# Patient Record
Sex: FEMALE | Race: AMERICAN INDIAN OR ALASKA NATIVE
[De-identification: names, ages, dates, MRNs, and addresses within clinical notes are randomized per-mention and may not be internally consistent; named-entity substitution may affect disease eponyms.]

---

## 2020-03-23 ENCOUNTER — HOSPITAL ENCOUNTER (EMERGENCY)
Dept: HOSPITAL 5 - ED | Age: 30
Discharge: HOME | End: 2020-03-23
Payer: SELF-PAY

## 2020-03-23 VITALS — DIASTOLIC BLOOD PRESSURE: 100 MMHG | SYSTOLIC BLOOD PRESSURE: 137 MMHG

## 2020-03-23 DIAGNOSIS — Z98.51: ICD-10-CM

## 2020-03-23 DIAGNOSIS — D69.6: ICD-10-CM

## 2020-03-23 DIAGNOSIS — Z79.2: ICD-10-CM

## 2020-03-23 DIAGNOSIS — Z79.1: ICD-10-CM

## 2020-03-23 DIAGNOSIS — Z79.899: ICD-10-CM

## 2020-03-23 DIAGNOSIS — R00.2: Primary | ICD-10-CM

## 2020-03-23 DIAGNOSIS — F17.200: ICD-10-CM

## 2020-03-23 DIAGNOSIS — D72.818: ICD-10-CM

## 2020-03-23 DIAGNOSIS — I10: ICD-10-CM

## 2020-03-23 DIAGNOSIS — Z88.8: ICD-10-CM

## 2020-03-23 LAB
ANISOCYTOSIS BLD QL SMEAR: (no result)
BAND NEUTROPHILS # (MANUAL): 0 K/MM3
BUN SERPL-MCNC: 8 MG/DL (ref 7–17)
BUN/CREAT SERPL: 11 %
CALCIUM SERPL-MCNC: 9.1 MG/DL (ref 8.4–10.2)
HCT VFR BLD CALC: 40 % (ref 30.3–42.9)
HEMOLYSIS INDEX: 6
HGB BLD-MCNC: 13.6 GM/DL (ref 10.1–14.3)
MCHC RBC AUTO-ENTMCNC: 34 % (ref 30–34)
MCV RBC AUTO: 81 FL (ref 79–97)
MYELOCYTES # (MANUAL): 0 K/MM3
PLATELET # BLD: 112 K/MM3 (ref 140–440)
PROMYELOCYTES # (MANUAL): 0 K/MM3
RBC # BLD AUTO: 4.96 M/MM3 (ref 3.65–5.03)
TOTAL CELLS COUNTED BLD: 100

## 2020-03-23 PROCEDURE — 93010 ELECTROCARDIOGRAM REPORT: CPT

## 2020-03-23 PROCEDURE — 84484 ASSAY OF TROPONIN QUANT: CPT

## 2020-03-23 PROCEDURE — 36415 COLL VENOUS BLD VENIPUNCTURE: CPT

## 2020-03-23 PROCEDURE — 85007 BL SMEAR W/DIFF WBC COUNT: CPT

## 2020-03-23 PROCEDURE — 80048 BASIC METABOLIC PNL TOTAL CA: CPT

## 2020-03-23 PROCEDURE — 71045 X-RAY EXAM CHEST 1 VIEW: CPT

## 2020-03-23 PROCEDURE — 93005 ELECTROCARDIOGRAM TRACING: CPT

## 2020-03-23 PROCEDURE — 85025 COMPLETE CBC W/AUTO DIFF WBC: CPT

## 2020-03-23 PROCEDURE — 84703 CHORIONIC GONADOTROPIN ASSAY: CPT

## 2020-03-23 NOTE — XRAY REPORT
CHEST 1 VIEW 



INDICATION / CLINICAL INFORMATION:

Chest Pain.



COMPARISON: 

None available.



FINDINGS:



SUPPORT DEVICES: None.

HEART / MEDIASTINUM: No significant abnormality. 

LUNGS / PLEURA: No significant pulmonary or pleural abnormality. No pneumothorax. 



ADDITIONAL FINDINGS: No significant additional findings.



IMPRESSION:

No acute pulmonary or pleural abnormality



Signer Name: Matthew Barrett MD FACR 

Signed: 3/23/2020 3:23 AM

Workstation Name: [id number]

## 2020-03-23 NOTE — EMERGENCY DEPARTMENT REPORT
ED General Adult HPI





- General


Chief complaint: Arrhythmia/Palpitations


Stated complaint: BODYACHES/CHILLS


Time Seen by Provider: 03/23/20 03:07


Source: patient


Mode of arrival: Ambulatory


Limitations: No Limitations





- History of Present Illness


Initial comments: 





Patient is a 29-year-old female presents emergency room with complaints of an 

episode of diaphoresis, chills, felt like her heart was skipping a beat that 

occurred today.  She states that she [place] and [place]  She denies ever having this in the past.  She denies any diarrhea, 

fever, shortness of breath, chest pain, leg swelling, recent travel, recent enriquez

rgery, hormone use, history of cancer.  She is a current every day smoker.  She 

is a nondrinker.  Denies drug use.  She states that she does drink a lot of 

caffeine and drinks approximately 3 sodas a day.  She denies any past medical 

history.  She has an allergy to Rocephin.


Severity scale (0 -10): 0





- Related Data


                                  Previous Rx's











 Medication  Instructions  Recorded  Last Taken  Type


 


Clindamycin [Clindamycin CAP] 300 mg PO Q8H 10 Days #30 cap 12/25/18 Unknown Rx


 


Omeprazole 40 mg PO DAILY #30 capsule. 04/26/19 Unknown Rx


 


Sucralfate [Carafate] 1 gm PO ACHS 7 Days #28 tablet 04/26/19 Unknown Rx


 


levoFLOXacin [Levaquin TAB] 500 mg PO QDAY #10 tablet 04/26/19 Unknown Rx


 


Naproxen 500 mg PO BID 7 Days #14 tablet 11/20/19 Unknown Rx


 


methOCARBAMOL [Robaxin TAB] 500 mg PO Q6H #20 tablet 11/20/19 Unknown Rx


 


Clindamycin [Clindamycin CAP] 450 mg PO TID 7 Days #63 capsule 03/22/20 Unknown 

Rx


 


Ibuprofen [Motrin 600 MG tab] 600 mg PO Q8H PRN #20 tablet 03/22/20 Unknown Rx


 


Penicillin Vk [Veetids TAB] 500 mg PO QID 7 Days #56 tablet 03/22/20 Unknown Rx


 


traMADoL [Ultram 50 MG tab] 50 mg PO Q6HR PRN #7 tablet 03/22/20 Unknown Rx


 


amLODIPine 10 mg PO DAILY #30 tablet 03/23/20 Unknown Rx











                                    Allergies











Allergy/AdvReac Type Severity Reaction Status Date / Time


 


ceftriaxone [From Rocephin] Allergy  Unknown Verified 04/26/19 17:51














ED Review of Systems


ROS: 


Stated complaint: BODYACHES/CHILLS


Other details as noted in HPI





Comment: All other systems reviewed and negative





ED Past Medical Hx





- Past Medical History


Previous Medical History?: Yes


Hx Hypertension: Yes (Not taking medication)


Additional medical history: vaginal ablation





- Surgical History


Past Surgical History?: Yes


Additional Surgical History: tubal ligation, Endometrial Ablation





- Social History


Smoking Status: Current Some Day Smoker





- Medications


Home Medications: 


                                Home Medications











 Medication  Instructions  Recorded  Confirmed  Last Taken  Type


 


Clindamycin [Clindamycin CAP] 300 mg PO Q8H 10 Days #30 cap 12/25/18  Unknown Rx


 


Omeprazole 40 mg PO DAILY #30 capsule. 04/26/19  Unknown Rx


 


Sucralfate [Carafate] 1 gm PO ACHS 7 Days #28 tablet 04/26/19  Unknown Rx


 


levoFLOXacin [Levaquin TAB] 500 mg PO QDAY #10 tablet 04/26/19  Unknown Rx


 


Naproxen 500 mg PO BID 7 Days #14 tablet 11/20/19  Unknown Rx


 


methOCARBAMOL [Robaxin TAB] 500 mg PO Q6H #20 tablet 11/20/19  Unknown Rx


 


Clindamycin [Clindamycin CAP] 450 mg PO TID 7 Days #63 capsule 03/22/20  Unknown

Rx


 


Ibuprofen [Motrin 600 MG tab] 600 mg PO Q8H PRN #20 tablet 03/22/20  Unknown Rx


 


Penicillin Vk [Veetids TAB] 500 mg PO QID 7 Days #56 tablet 03/22/20  Unknown Rx


 


traMADoL [Ultram 50 MG tab] 50 mg PO Q6HR PRN #7 tablet 03/22/20  Unknown Rx


 


amLODIPine 10 mg PO DAILY #30 tablet 03/23/20  Unknown Rx














ED Physical Exam





- General


Limitations: No Limitations


General appearance: alert, in no apparent distress





- Head


Head exam: Present: atraumatic, normocephalic





- Eye


Eye exam: Present: normal appearance





- ENT


ENT exam: Present: mucous membranes moist





- Respiratory


Respiratory exam: Present: normal lung sounds bilaterally.  Absent: respiratory 

distress, wheezes, rales, rhonchi, stridor, chest wall tenderness, accessory 

muscle use, decreased breath sounds, prolonged expiratory





- Cardiovascular


Cardiovascular Exam: Present: regular rate, normal rhythm, normal heart sounds. 

Absent: systolic murmur, diastolic murmur, rubs, gallop





- Neurological Exam


Neurological exam: Present: alert, oriented X3





- Psychiatric


Psychiatric exam: Present: normal affect, normal mood





- Skin


Skin exam: Present: warm, dry, intact





ED Course


                                   Vital Signs











  03/23/20 03/23/20 03/23/20





  00:28 00:44 04:16


 


Temperature 98.2 F  99 F


 


Pulse Rate 91 H 91 H 77


 


Respiratory 18  18





Rate   


 


Blood Pressure 159/111 159/11 


 


Blood Pressure   137/100





[Left]   


 


O2 Sat by Pulse 100  100





Oximetry   














ED Medical Decision Making





- Lab Data


Result diagrams: 


                                 03/23/20 00:44





                                 03/23/20 00:44








                                   Lab Results











  03/23/20 03/23/20 03/23/20 Range/Units





  00:44 00:44 00:44 


 


WBC   2.7 L   (4.5-11.0)  K/mm3


 


RBC   4.96   (3.65-5.03)  M/mm3


 


Hgb   13.6   (10.1-14.3)  gm/dl


 


Hct   40.0   (30.3-42.9)  %


 


MCV   81   (79-97)  fl


 


MCH   27 L   (28-32)  pg


 


MCHC   34   (30-34)  %


 


RDW   15.7 H   (13.2-15.2)  %


 


Plt Count   112 L   (140-440)  K/mm3


 


Mono % (Auto)   Np   


 


Add Manual Diff   Complete   


 


Total Counted   100   


 


Seg Neuts % (Manual)   60.0   (40.0-70.0)  %


 


Band Neutrophils %   0   %


 


Lymphocytes % (Manual)   20.0   (13.4-35.0)  %


 


Reactive Lymphs % (Man)   0   %


 


Monocytes % (Manual)   18.0 H   (0.0-7.3)  %


 


Eosinophils % (Manual)   1.0   (0.0-4.3)  %


 


Basophils % (Manual)   1.0   (0.0-1.8)  %


 


Metamyelocytes %   0   %


 


Myelocytes %   0   %


 


Promyelocytes %   0   %


 


Blast Cells %   0   %


 


Nucleated RBC %   Not Reportable   


 


Seg Neutrophils # Man   1.6 L   (1.8-7.7)  K/mm3


 


Band Neutrophils #   0.0   K/mm3


 


Lymphocytes # (Manual)   0.5 L   (1.2-5.4)  K/mm3


 


Abs React Lymphs (Man)   0.0   K/mm3


 


Monocytes # (Manual)   0.5   (0.0-0.8)  K/mm3


 


Eosinophils # (Manual)   0.0   (0.0-0.4)  K/mm3


 


Basophils # (Manual)   0.0   (0.0-0.1)  K/mm3


 


Metamyelocytes #   0.0   K/mm3


 


Myelocytes #   0.0   K/mm3


 


Promyelocytes #   0.0   K/mm3


 


Blast Cells #   0.0   K/mm3


 


WBC Morphology   Not Reportable   


 


Hypersegmented Neuts   Not Reportable   


 


Hyposegmented Neuts   Not Reportable   


 


Hypogranular Neuts   Not Reportable   


 


Smudge Cells   Not Reportable   


 


Toxic Granulation   Not Reportable   


 


Toxic Vacuolation   Not Reportable   


 


Dohle Bodies   Not Reportable   


 


Pelger-Huet Anomaly   Not Reportable   


 


Gina Rods   Not Reportable   


 


Platelet Estimate   Consistent w auto   


 


Clumped Platelets   Not Reportable   


 


Plt Clumps, EDTA   Not Reportable   


 


Large Platelets   Not Reportable   


 


Giant Platelets   Not Reportable   


 


Platelet Satelliting   Not Reportable   


 


Plt Morphology Comment   Not Reportable   


 


RBC Morphology   Not Reportable   


 


Dimorphic RBCs   Not Reportable   


 


Polychromasia   Not Reportable   


 


Hypochromasia   Not Reportable   


 


Poikilocytosis   Not Reportable   


 


Anisocytosis   Rare   


 


Microcytosis   Not Reportable   


 


Macrocytosis   Not Reportable   


 


Spherocytes   Not Reportable   


 


Pappenheimer Bodies   Not Reportable   


 


Sickle Cells   Not Reportable   


 


Target Cells   Not Reportable   


 


Tear Drop Cells   Not Reportable   


 


Ovalocytes   Rare   


 


Helmet Cells   Not Reportable   


 


[place] Bodies   Not Reportable   


 


Cabot Rings   Not Reportable   


 


[place] Cells   Not Reportable   


 


Bite Cells   Not Reportable   


 


Crenated Cell   Not Reportable   


 


Elliptocytes   Not Reportable   


 


Acanthocytes (Spur)   Not Reportable   


 


Rouleaux   Not Reportable   


 


Hemoglobin C Crystals   Not Reportable   


 


Schistocytes   Not Reportable   


 


Malaria parasites   Not Reportable   


 


Bandar Bodies   Not Reportable   


 


Hem Pathologist Commnt   No   


 


Sodium    138  (137-145)  mmol/L


 


Potassium    3.6  (3.6-5.0)  mmol/L


 


Chloride    99.2  ()  mmol/L


 


Carbon Dioxide    28  (22-30)  mmol/L


 


Anion Gap    14  mmol/L


 


BUN    8  (7-17)  mg/dL


 


Creatinine    0.7  (0.7-1.2)  mg/dL


 


Estimated GFR    > 60  ml/min


 


BUN/Creatinine Ratio    11  %


 


Glucose    100  ()  mg/dL


 


Calcium    9.1  (8.4-10.2)  mg/dL


 


Troponin T    < 0.010  (0.00-0.029)  ng/mL


 


HCG, Qual  Negative    (Negative)  














  03/23/20 Range/Units





  03:44 


 


WBC   (4.5-11.0)  K/mm3


 


RBC   (3.65-5.03)  M/mm3


 


Hgb   (10.1-14.3)  gm/dl


 


Hct   (30.3-42.9)  %


 


MCV   (79-97)  fl


 


MCH   (28-32)  pg


 


MCHC   (30-34)  %


 


RDW   (13.2-15.2)  %


 


Plt Count   (140-440)  K/mm3


 


Mono % (Auto)   


 


Add Manual Diff   


 


Total Counted   


 


Seg Neuts % (Manual)   (40.0-70.0)  %


 


Band Neutrophils %   %


 


Lymphocytes % (Manual)   (13.4-35.0)  %


 


Reactive Lymphs % (Man)   %


 


Monocytes % (Manual)   (0.0-7.3)  %


 


Eosinophils % (Manual)   (0.0-4.3)  %


 


Basophils % (Manual)   (0.0-1.8)  %


 


Metamyelocytes %   %


 


Myelocytes %   %


 


Promyelocytes %   %


 


Blast Cells %   %


 


Nucleated RBC %   


 


Seg Neutrophils # Man   (1.8-7.7)  K/mm3


 


Band Neutrophils #   K/mm3


 


Lymphocytes # (Manual)   (1.2-5.4)  K/mm3


 


Abs React Lymphs (Man)   K/mm3


 


Monocytes # (Manual)   (0.0-0.8)  K/mm3


 


Eosinophils # (Manual)   (0.0-0.4)  K/mm3


 


Basophils # (Manual)   (0.0-0.1)  K/mm3


 


Metamyelocytes #   K/mm3


 


Myelocytes #   K/mm3


 


Promyelocytes #   K/mm3


 


Blast Cells #   K/mm3


 


WBC Morphology   


 


Hypersegmented Neuts   


 


Hyposegmented Neuts   


 


Hypogranular Neuts   


 


Smudge Cells   


 


Toxic Granulation   


 


Toxic Vacuolation   


 


Dohle Bodies   


 


Pelger-Huet Anomaly   


 


Gina Rods   


 


Platelet Estimate   


 


Clumped Platelets   


 


Plt Clumps, EDTA   


 


Large Platelets   


 


Giant Platelets   


 


Platelet Satelliting   


 


Plt Morphology Comment   


 


RBC Morphology   


 


Dimorphic RBCs   


 


Polychromasia   


 


Hypochromasia   


 


Poikilocytosis   


 


Anisocytosis   


 


Microcytosis   


 


Macrocytosis   


 


Spherocytes   


 


Pappenheimer Bodies   


 


Sickle Cells   


 


Target Cells   


 


Tear Drop Cells   


 


Ovalocytes   


 


Helmet Cells   


 


[place] Bodies   


 


Cabot Rings   


 


Samuel Cells   


 


Bite Cells   


 


Crenated Cell   


 


Elliptocytes   


 


Acanthocytes (Spur)   


 


Rouleaux   


 


Hemoglobin C Crystals   


 


Schistocytes   


 


Malaria parasites   


 


Bandar Bodies   


 


Hem Pathologist Commnt   


 


Sodium   (137-145)  mmol/L


 


Potassium   (3.6-5.0)  mmol/L


 


Chloride   ()  mmol/L


 


Carbon Dioxide   (22-30)  mmol/L


 


Anion Gap   mmol/L


 


BUN   (7-17)  mg/dL


 


Creatinine   (0.7-1.2)  mg/dL


 


Estimated GFR   ml/min


 


BUN/Creatinine Ratio   %


 


Glucose   ()  mg/dL


 


Calcium   (8.4-10.2)  mg/dL


 


Troponin T  < 0.010  (0.00-0.029)  ng/mL


 


HCG, Qual   (Negative)  














- EKG Data


EKG shows normal: sinus rhythm, axis, intervals, QRS complexes, ST-T waves


Rate: normal





- Radiology Data


Radiology results: report reviewed





Chest x-ray


No acute abnormality





- Medical Decision Making





Patient is a 29-year-old female presents emergency room with complaints of an 

episode of diaphoresis, chills, felt like her heart was skipping a beat that 

occurred today.  She states that [place]  She denies ever having this in the past.  She denies any diarrhea, 

fever, shortness of breath, chest pain, leg swelling, recent travel, recent 

surgery, hormone use, history of cancer.  She is a current every day smoker.  

She is a nondrinker.  Denies drug use.  She states that she does drink a lot of 

caffeine and drinks approximately 3 sodas a day.  She denies any past medical 

history.  She has an allergy to Rocephin.  Vitals with elevated blood pressure, 

otherwise stable.  No abnormality on physical examination as documented in 

chart.  EKG within normal limits.  Chest x-ray with no acute process.  Troponin 

is negative x2.  Labs with nonspecific decrease in white blood cell count, 

platelets, lymphocytes could be due to medications versus viral infection.  PERC

 criteria negative for PE.  ELKE score is 0 and heart score is 1, very low risk 

for cardiac event. it appears during patient's last several visits her blood 

pressure has always been elevated, she is not taking any blood pressure medi

cation.  Patient will be placed on amlodipine 10 mg daily. advised pt to Please 

take medication as prescribed.  Avoid caffeine use or any other stimulants.  

Please consider stopping smoking.  Follow-up with a cardiologist.  Follow-up 

with your primary care doctor.  Eat a low-sodium diet, incorporate daily 

exercise.  Keep a blood pressure log and take your blood pressure 3 times a day 

and take this to the primary care physician.  Please have routine blood work 

repeated with a primary care doctor.  Return to the emergency room for any new 

or worsening symptoms.


Critical care attestation.: 


If time is entered above; I have spent that time in minutes in the direct care o

f this critically ill patient, excluding procedure time.








ED Disposition


Clinical Impression: 


 Palpitations, Thrombocytopenia





Hypertension


Qualifiers:


 Hypertension type: unspecified Qualified Code(s): I10 - Essential (primary) 

hypertension





Leukopenia


Qualifiers:


 Leukopenia type: neutropenia Neutropenia type: unspecified Qualified Code(s): 

D70.9 - Neutropenia, unspecified





Disposition: DC-01 TO HOME OR SELFCARE


Is pt being admited?: No


Does the pt Need Aspirin: No


Condition: Stable


Instructions:  Palpitations (ED), Hypertension (ED)


Additional Instructions: 


Please take medication as prescribed.  Avoid caffeine use or any other 

stimulants.  Please consider stopping smoking.  Follow-up with a cardiologist.  

Follow-up with your primary care doctor.  Eat a low-sodium diet, incorporate 

daily exercise.  Keep a blood pressure log and take your blood pressure 3 times 

a day and take this to the primary care physician.  Please have routine blood 

work repeated with a primary care doctor.  Return to the emergency room for any 

new or worsening symptoms.


Prescriptions: 


amLODIPine 10 mg PO DAILY #30 tablet


Referrals: 


[facility] [Provider Group] - 3-5 Days


[facility] [Provider Group] - 3-5 Days


YEMI HAN MD [Staff Physician] - 3-5 Days


[facility] [Outside] - 3-5 Days


Time of Disposition: 04:48


Print Language: ENGLISH

## 2020-03-24 ENCOUNTER — HOSPITAL ENCOUNTER (INPATIENT)
Dept: HOSPITAL 5 - ED | Age: 30
LOS: 4 days | Discharge: HOME | DRG: 690 | End: 2020-03-28
Attending: INTERNAL MEDICINE | Admitting: INTERNAL MEDICINE
Payer: COMMERCIAL

## 2020-03-24 DIAGNOSIS — Z20.828: ICD-10-CM

## 2020-03-24 DIAGNOSIS — D72.819: ICD-10-CM

## 2020-03-24 DIAGNOSIS — D69.6: ICD-10-CM

## 2020-03-24 DIAGNOSIS — Z82.49: ICD-10-CM

## 2020-03-24 DIAGNOSIS — Z88.8: ICD-10-CM

## 2020-03-24 DIAGNOSIS — J06.9: ICD-10-CM

## 2020-03-24 DIAGNOSIS — Z98.51: ICD-10-CM

## 2020-03-24 DIAGNOSIS — N39.0: Primary | ICD-10-CM

## 2020-03-24 DIAGNOSIS — R65.10: ICD-10-CM

## 2020-03-24 LAB
ALBUMIN SERPL-MCNC: 3.8 G/DL (ref 3.9–5)
ALT SERPL-CCNC: 13 UNITS/L (ref 7–56)
BAND NEUTROPHILS # (MANUAL): 0 K/MM3
BILIRUB DIRECT SERPL-MCNC: < 0.2 MG/DL (ref 0–0.2)
BILIRUB UR QL STRIP: (no result)
BLOOD UR QL VISUAL: (no result)
BUN SERPL-MCNC: 9 MG/DL (ref 7–17)
BUN/CREAT SERPL: 13 %
CALCIUM SERPL-MCNC: 8.7 MG/DL (ref 8.4–10.2)
HCT VFR BLD CALC: 44.6 % (ref 30.3–42.9)
HCT VFR BLD CALC: 45.5 % (ref 30.3–42.9)
HEMOLYSIS INDEX: 25
HGB BLD-MCNC: 14.2 GM/DL (ref 10.1–14.3)
HGB BLD-MCNC: 14.4 GM/DL (ref 10.1–14.3)
MCHC RBC AUTO-ENTMCNC: 32 % (ref 30–34)
MCHC RBC AUTO-ENTMCNC: 32 % (ref 30–34)
MCV RBC AUTO: 82 FL (ref 79–97)
MCV RBC AUTO: 83 FL (ref 79–97)
MUCOUS THREADS #/AREA URNS HPF: (no result) /HPF
MYELOCYTES # (MANUAL): 0 K/MM3
PH UR STRIP: 6 [PH] (ref 5–7)
PLATELET # BLD: 88 K/MM3 (ref 140–440)
PLATELET # BLD: 97 K/MM3 (ref 140–440)
PROMYELOCYTES # (MANUAL): 0 K/MM3
PROT UR STRIP-MCNC: (no result) MG/DL
RBC # BLD AUTO: 5.41 M/MM3 (ref 3.65–5.03)
RBC # BLD AUTO: 5.5 M/MM3 (ref 3.65–5.03)
RBC #/AREA URNS HPF: 6 /HPF (ref 0–6)
TOTAL CELLS COUNTED BLD: 100
UROBILINOGEN UR-MCNC: 2 MG/DL (ref ?–2)
WBC #/AREA URNS HPF: 8 /HPF (ref 0–6)

## 2020-03-24 PROCEDURE — 86689 HTLV/HIV CONFIRMJ ANTIBODY: CPT

## 2020-03-24 PROCEDURE — 85027 COMPLETE CBC AUTOMATED: CPT

## 2020-03-24 PROCEDURE — 71046 X-RAY EXAM CHEST 2 VIEWS: CPT

## 2020-03-24 PROCEDURE — 36415 COLL VENOUS BLD VENIPUNCTURE: CPT

## 2020-03-24 PROCEDURE — 84703 CHORIONIC GONADOTROPIN ASSAY: CPT

## 2020-03-24 PROCEDURE — 81025 URINE PREGNANCY TEST: CPT

## 2020-03-24 PROCEDURE — 80048 BASIC METABOLIC PNL TOTAL CA: CPT

## 2020-03-24 PROCEDURE — 87400 INFLUENZA A/B EACH AG IA: CPT

## 2020-03-24 PROCEDURE — 87086 URINE CULTURE/COLONY COUNT: CPT

## 2020-03-24 PROCEDURE — 84145 PROCALCITONIN (PCT): CPT

## 2020-03-24 PROCEDURE — 80076 HEPATIC FUNCTION PANEL: CPT

## 2020-03-24 PROCEDURE — 85025 COMPLETE CBC W/AUTO DIFF WBC: CPT

## 2020-03-24 PROCEDURE — 87040 BLOOD CULTURE FOR BACTERIA: CPT

## 2020-03-24 PROCEDURE — 81001 URINALYSIS AUTO W/SCOPE: CPT

## 2020-03-24 PROCEDURE — 85007 BL SMEAR W/DIFF WBC COUNT: CPT

## 2020-03-24 NOTE — EVENT NOTE
ED Screening Note


Date of service: 03/24/20


Time: 19:17


ED Screening Note: 








This initial assessment/diagnostic orders/clinical plan/treatment(s) is/are 

subject to change based on patients health status, clinical progression and re-

assessment by fellow clinical providers in the ED. Further treatment and workup 

at subsequent clinical providers discretion. Patient/guardian urged not to elope

from the ED as their condition may be serious if not clinically assessed and 

managed. 





Initial orders include: 


30yo F states that she has fever, chest congestion and malaise x 2 days.

## 2020-03-24 NOTE — HISTORY AND PHYSICAL REPORT
History of Present Illness


History of present illness: 


29-year-old woman with a history of hypertension comes emergency room for 

evaluation.  Patient was initially seen in the emergency room on the 22nd for 

tooth pain, then on the 23rd for an episode of palpitation.  She returns today 

complaining of a nonproductive cough, fever, chills, body aches, fatigue.  [place] 

[place], denies recent travel, sick contacts.  She has a history of

thrombocytopenia since 2018, no bleeding from mucosal surfaces.  Patient will be

admitted for upper respiratory symptoms, possible corona


Review Of  Systems:


Constitutional: no weight loss


Ears, eyes, nose, mouth and throat: no nasal congestion, no nasal discharge, no 

sinus pressure, blurry vision, diplopia


Neck: No neck pain or rigidity.


Cardiovascular: No  palpitations, chest pain


Respiratory: No shortness of breath, cough


Gastrointestinal: No hematochezia, abdominal pain


Genitourinary : no dysuria, frequency


Musculoskeletal: no joint pain


Integumentary: no rash, no pruritis


Neurological: no parathesias, focal weakness


Endocrine: no cold or heat intolerance, no polyuria or polydipsia


Hematologic/Lymphatic: no easy bruising, no easy bleeding, no gland swelling


Allergic/Immunologic: no urticaria, no angioedema.





PAST MEDICAL HISTORY: Hypertension





PAST SURGICAL HISTORY: None





SOCIAL HISTORY: Denies alcohol, tobacco,  drugs





FAMILY HISTORY: Hypertension











Medications and Allergies


                                    Allergies











Allergy/AdvReac Type Severity Reaction Status Date / Time


 


ceftriaxone [From Rocephin] Allergy  Unknown Verified 04/26/19 17:51











                                Home Medications











 Medication  Instructions  Recorded  Confirmed  Last Taken  Type


 


Omeprazole 40 mg PO DAILY #30 capsule. 04/26/19  Unknown Rx


 


Sucralfate [Carafate] 1 gm PO ACHS 7 Days #28 tablet 04/26/19  Unknown Rx


 


methOCARBAMOL [Robaxin TAB] 500 mg PO Q6H #20 tablet 11/20/19  Unknown Rx


 


Ibuprofen [Motrin 600 MG tab] 600 mg PO Q8H PRN #20 tablet 03/22/20  Unknown Rx


 


traMADoL [Ultram 50 MG tab] 50 mg PO Q6HR PRN #7 tablet 03/22/20  Unknown Rx


 


amLODIPine 10 mg PO DAILY #30 tablet 03/28/20  Unknown Rx


 


levoFLOXacin [Levaquin TAB] 750 mg PO DAILY #3 tablet 03/28/20  Unknown Rx











Active Meds: 


Active Medications





Metoclopramide HCl (Reglan)  10 mg IV Q6H PRN


   PRN Reason: Nausea And Vomiting











Exam





- Physical Exam


Narrative exam: 


Gen. appearance: Patient lying in bed, no apparent distress


HEENT: Normocephalic, atraumatic, pupils equally round and reactive to light, 

extraocular movement intact, and no sclericterus,. No JVD or thyromegaly or 

nodule,neck supple, no carotid bruit ,mucous membranes moist, no exudate or 

erythema


Heart: S1, S2, regular rate and rhythm


Lungs: Clear bilaterally, breathing comfortable


Abdomen: Positive bowel sounds, nontender, nondistended, no organomegaly


Extremity: no edema, cyanosis, clubbing


Skin: No rash, nodules, warm, dry


Neuro: Cranial nerves II to XII intact, speech is fluent, moves extremities, 

sensory intact











- Constitutional


Vitals: 


                                        











Temp Pulse Resp BP Pulse Ox


 


 100.5 F H  98 H  18   152/101   99 


 


 03/24/20 19:19  03/24/20 19:19  03/24/20 19:19  03/24/20 19:19  03/24/20 19:19














Results





- Labs


CBC & Chem 7: 


                                 03/25/20 04:11





                                 03/25/20 04:11


Labs: 


                              Abnormal lab results











  03/24/20 03/24/20 03/24/20 Range/Units





  19:27 19:27 19:27 


 


WBC  2.3 L    (4.5-11.0)  K/mm3


 


RBC  5.50 H    (3.65-5.03)  M/mm3


 


Hgb  14.4 H    (10.1-14.3)  gm/dl


 


Hct  45.5 H    (30.3-42.9)  %


 


MCH  26 L    (28-32)  pg


 


RDW  16.0 H    (13.2-15.2)  %


 


Plt Count  88 L    (140-440)  K/mm3


 


Seg Neutrophils # Man     (1.8-7.7)  K/mm3


 


Lymphocytes # (Manual)     (1.2-5.4)  K/mm3


 


Sodium   133 L   (137-145)  mmol/L


 


Chloride   96.5 L   ()  mmol/L


 


Carbon Dioxide   20 L D   (22-30)  mmol/L


 


Albumin    3.8 L  (3.9-5)  g/dL


 


Urine WBC (Auto)     (0.0-6.0)  /HPF














  03/24/20 03/24/20 Range/Units





  Unknown Unknown 


 


WBC   2.3 L  (4.5-11.0)  K/mm3


 


RBC   5.41 H  (3.65-5.03)  M/mm3


 


Hgb    (10.1-14.3)  gm/dl


 


Hct   44.6 H  (30.3-42.9)  %


 


MCH   26 L  (28-32)  pg


 


RDW   16.2 H  (13.2-15.2)  %


 


Plt Count   97 L  (140-440)  K/mm3


 


Seg Neutrophils # Man   1.5 L  (1.8-7.7)  K/mm3


 


Lymphocytes # (Manual)   0.5 L  (1.2-5.4)  K/mm3


 


Sodium    (137-145)  mmol/L


 


Chloride    ()  mmol/L


 


Carbon Dioxide    (22-30)  mmol/L


 


Albumin    (3.9-5)  g/dL


 


Urine WBC (Auto)  8.0 H   (0.0-6.0)  /HPF














- Imaging and Cardiology


EKG: image reviewed


Chest x-ray: report reviewed





Assessment and Plan


Assessment


Upper respiratory symptoms, rule out COVID


Patient will be placed on droplet and contact precautions


COVID forms were filled out, consult ID





Hypertension


IV hydralazine for control,, continue outpatient medications





Chronic leukocytopenia and leukopenia, continue to monitor


Follow-up HIV testing





Urinary tract infection


Start IV Levaquin, follow culture


DVT prophylax

## 2020-03-24 NOTE — EMERGENCY DEPARTMENT REPORT
<GUSTAVO CHRISTIAN III - Last Filed: 03/25/20 00:40>





- General


Chief Complaint: Upper Respiratory Infection


Stated Complaint: CHILLS,BODYACHE, HEADACHE


Time Seen by Provider: 03/24/20 19:17





- Related Data


                                  Previous Rx's











 Medication  Instructions  Recorded  Last Taken  Type


 


Clindamycin [Clindamycin CAP] 300 mg PO Q8H 10 Days #30 cap 12/25/18 Unknown Rx


 


Omeprazole 40 mg PO DAILY #30 capsule. 04/26/19 Unknown Rx


 


Sucralfate [Carafate] 1 gm PO ACHS 7 Days #28 tablet 04/26/19 Unknown Rx


 


levoFLOXacin [Levaquin TAB] 500 mg PO QDAY #10 tablet 04/26/19 Unknown Rx


 


Naproxen 500 mg PO BID 7 Days #14 tablet 11/20/19 Unknown Rx


 


methOCARBAMOL [Robaxin TAB] 500 mg PO Q6H #20 tablet 11/20/19 Unknown Rx


 


Clindamycin [Clindamycin CAP] 450 mg PO TID 7 Days #63 capsule 03/22/20 Unknown 

Rx


 


Ibuprofen [Motrin 600 MG tab] 600 mg PO Q8H PRN #20 tablet 03/22/20 Unknown Rx


 


Penicillin Vk [Veetids TAB] 500 mg PO QID 7 Days #56 tablet 03/22/20 Unknown Rx


 


traMADoL [Ultram 50 MG tab] 50 mg PO Q6HR PRN #7 tablet 03/22/20 Unknown Rx


 


amLODIPine 10 mg PO DAILY #30 tablet 03/23/20 Unknown Rx











                                    Allergies











Allergy/AdvReac Type Severity Reaction Status Date / Time


 


ceftriaxone [From Rocephin] Allergy  Unknown Verified 04/26/19 17:51














ED Past Medical Hx





- Medications


Home Medications: 


                                Home Medications











 Medication  Instructions  Recorded  Confirmed  Last Taken  Type


 


Clindamycin [Clindamycin CAP] 300 mg PO Q8H 10 Days #30 cap 12/25/18  Unknown Rx


 


Omeprazole 40 mg PO DAILY #30 capsule. 04/26/19  Unknown Rx


 


Sucralfate [Carafate] 1 gm PO ACHS 7 Days #28 tablet 04/26/19  Unknown Rx


 


levoFLOXacin [Levaquin TAB] 500 mg PO QDAY #10 tablet 04/26/19  Unknown Rx


 


Naproxen 500 mg PO BID 7 Days #14 tablet 11/20/19  Unknown Rx


 


methOCARBAMOL [Robaxin TAB] 500 mg PO Q6H #20 tablet 11/20/19  Unknown Rx


 


Clindamycin [Clindamycin CAP] 450 mg PO TID 7 Days #63 capsule 03/22/20  Unknown

Rx


 


Ibuprofen [Motrin 600 MG tab] 600 mg PO Q8H PRN #20 tablet 03/22/20  Unknown Rx


 


Penicillin Vk [Veetids TAB] 500 mg PO QID 7 Days #56 tablet 03/22/20  Unknown Rx


 


traMADoL [Ultram 50 MG tab] 50 mg PO Q6HR PRN #7 tablet 03/22/20  Unknown Rx


 


amLODIPine 10 mg PO DAILY #30 tablet 03/23/20  Unknown Rx














ED Course





- Reevaluation(s)


Reevaluation #2: 


I examined patient.  I agree with midlevel's assessment and plan.





I discussed all results with patient.  I discussed plan of care with patient.  

Patient agrees with plan of care and admission.  Patient to be admitted to the 

hospitalist service.


03/25/20 00:41














ED Medical Decision Making





- Lab Data


Result diagrams: 


                                03/24/20 Unknown





                                 03/24/20 19:27





ED Disposition


Clinical Impression: 


 Thrombocytopenia, Cough, Chills





Fever


Qualifiers:


 Fever type: unspecified Qualified Code(s): R50.9 - Fever, unspecified





Leukopenia


Qualifiers:


 Leukopenia type: neutropenia Neutropenia type: unspecified Qualified Code(s): 

D70.9 - Neutropenia, unspecified





Neutropenia


Qualifiers:


 Neutropenia type: unspecified Qualified Code(s): D70.9 - Neutropenia, 

unspecified





Headache


Qualifiers:


 Headache type: unspecified Headache chronicity pattern: acute headache 

Intractability: not intractable Qualified Code(s): R51 - Headache





Disposition: DC-09 OP ADMIT IP TO THIS HOSP


Is pt being admited?: Yes


Does the pt Need Aspirin: No


Condition: Critical





<SERGIO GIPSON - Last Filed: 03/25/20 03:01>





- General


Source: patient


Mode of arrival: Ambulatory


Limitations: No Limitations





- History of Present Illness


Initial Comments: 





Patient is a 29-year-old female presents emergency room with complaints of URI 

symptoms that began yesterday.  she states the fever began today. She has 

associated cough, headache, chills, generalized body ache, fever, nausea.  She 

denies any shortness of breath, chest pain, vomiting, diarrhea, urinary 

symptoms, abdominal pain.  She has a past medical history of hypertension.  She 

has an allergy to Rocephin.  She denies any sick contacts or recent travel or r

ecent surgeries.  Patient was evaluated in the emergency department yesterday 

early morning with similar symptoms but did not have a fever at that time and 

had a chest x-ray performed with no acute process, labs did show some decrease 

in the white blood cell count and platelet at that time.





ED Review of Systems


ROS: 


Stated complaint: CHILLS,BODYACHE, HEADACHE


Other details as noted in HPI





Comment: All other systems reviewed and negative





ED Past Medical Hx





- Past Medical History


Hx Hypertension: Yes (Not taking medication)


Additional medical history: vaginal ablation





- Surgical History


Additional Surgical History: tubal ligation, Endometrial Ablation





- Social History


Smoking Status: Current Some Day Smoker





ED Physical Exam





- General


Limitations: No Limitations


General appearance: alert, in no apparent distress





- Head


Head exam: Present: atraumatic, normocephalic





- Eye


Eye exam: Present: normal appearance





- ENT


ENT exam: Present: normal orophraynx, mucous membranes moist, TM's normal 

bilaterally, normal external ear exam





- Respiratory


Respiratory exam: Present: normal lung sounds bilaterally.  Absent: respiratory 

distress, wheezes, rales, rhonchi, stridor, chest wall tenderness, accessory 

muscle use, decreased breath sounds, prolonged expiratory





- Cardiovascular


Cardiovascular Exam: Present: regular rate, normal rhythm, normal heart sounds. 

Absent: systolic murmur, diastolic murmur, rubs, gallop





- Neurological Exam


Neurological exam: Present: alert, oriented X3, CN II-XII intact, normal gait.  

Absent: motor sensory deficit





- Psychiatric


Psychiatric exam: Present: normal affect, normal mood





- Skin


Skin exam: Present: warm, dry, intact





ED Course


                                   Vital Signs











  03/24/20 03/24/20





  18:56 19:19


 


Temperature 100.3 F H 100.5 F H


 


Pulse Rate 89 98 H


 


Respiratory 18 18





Rate  


 


Blood Pressure  152/101


 


Blood Pressure 177/127 





[Left]  


 


O2 Sat by Pulse 100 99





Oximetry  














- Reevaluation(s)


Reevaluation #1: 





03/24/20 23:15


Discussed case with Dr. Christian, ER attending regarding patient history and 

results recommended admission to the hospitalist service








- Consultations


Consultation #1: 





03/24/20 23:18





Spoke with Dr. Macdonald, hospitalist regarding patient history and results, advised

 to place on droplet precautions and to submit COVID 19 form to the CDC, will 

accept and resume care of patient, will admit patient to the hospital





ED Medical Decision Making





- Lab Data


Result diagrams: 


                                03/24/20 Unknown





                                 03/24/20 19:27








                                   Lab Results











  03/24/20 03/24/20 03/24/20 Range/Units





  19:27 19:27 19:27 


 


WBC  2.3 L    (4.5-11.0)  K/mm3


 


RBC  5.50 H    (3.65-5.03)  M/mm3


 


Hgb  14.4 H    (10.1-14.3)  gm/dl


 


Hct  45.5 H    (30.3-42.9)  %


 


MCV  83    (79-97)  fl


 


MCH  26 L    (28-32)  pg


 


MCHC  32    (30-34)  %


 


RDW  16.0 H    (13.2-15.2)  %


 


Plt Count  88 L    (140-440)  K/mm3


 


Sodium   133 L   (137-145)  mmol/L


 


Potassium   3.7   (3.6-5.0)  mmol/L


 


Chloride   96.5 L   ()  mmol/L


 


Carbon Dioxide   20 L D   (22-30)  mmol/L


 


Anion Gap   20   mmol/L


 


BUN   9   (7-17)  mg/dL


 


Creatinine   0.7   (0.7-1.2)  mg/dL


 


Estimated GFR   > 60   ml/min


 


BUN/Creatinine Ratio   13   %


 


Glucose   84   ()  mg/dL


 


Calcium   8.7   (8.4-10.2)  mg/dL


 


Total Bilirubin    0.20  (0.1-1.2)  mg/dL


 


Direct Bilirubin    < 0.2  (0-0.2)  mg/dL


 


Indirect Bilirubin    0.0  mg/dL


 


AST    26  (5-40)  units/L


 


ALT    13  (7-56)  units/L


 


Alkaline Phosphatase    97  ()  units/L


 


Total Protein    7.1  (6.3-8.2)  g/dL


 


Albumin    3.8 L  (3.9-5)  g/dL


 


Albumin/Globulin Ratio    1.2  %


 


HCG, Qual     (Negative)  














  03/24/20 Range/Units





  20:49 


 


WBC   (4.5-11.0)  K/mm3


 


RBC   (3.65-5.03)  M/mm3


 


Hgb   (10.1-14.3)  gm/dl


 


Hct   (30.3-42.9)  %


 


MCV   (79-97)  fl


 


MCH   (28-32)  pg


 


MCHC   (30-34)  %


 


RDW   (13.2-15.2)  %


 


Plt Count   (140-440)  K/mm3


 


Sodium   (137-145)  mmol/L


 


Potassium   (3.6-5.0)  mmol/L


 


Chloride   ()  mmol/L


 


Carbon Dioxide   (22-30)  mmol/L


 


Anion Gap   mmol/L


 


BUN   (7-17)  mg/dL


 


Creatinine   (0.7-1.2)  mg/dL


 


Estimated GFR   ml/min


 


BUN/Creatinine Ratio   %


 


Glucose   ()  mg/dL


 


Calcium   (8.4-10.2)  mg/dL


 


Total Bilirubin   (0.1-1.2)  mg/dL


 


Direct Bilirubin   (0-0.2)  mg/dL


 


Indirect Bilirubin   mg/dL


 


AST   (5-40)  units/L


 


ALT   (7-56)  units/L


 


Alkaline Phosphatase   ()  units/L


 


Total Protein   (6.3-8.2)  g/dL


 


Albumin   (3.9-5)  g/dL


 


Albumin/Globulin Ratio   %


 


HCG, Qual  Negative  (Negative)  














- Radiology Data


Radiology results: report reviewed





CHEST 2 VIEWS 





INDICATION / CLINICAL INFORMATION: 


cough, fever. 





COMPARISON: 


Chest radiograph 3/23/2020 





FINDINGS: 





SUPPORT DEVICES: None. 





HEART / MEDIASTINUM: No significant abnormality. 





LUNGS / PLEURA: No significant pulmonary or pleural abnormality. No 

pneumothorax. 





ADDITIONAL FINDINGS: No significant additional findings. 





IMPRESSION: 


No acute finding. No significant change. 





Signer Name: Bhanu Pathak MD 


Signed: 3/24/2020 10:41 PM 


Workstation Name: [id number] 








 Transcribed By: DERIAN 


 Dictated By: Bhanu Pathak MD 


 Electronically Authenticated By: Bhanu Pathak MD 


 Signed Date/Time: 03/24/20 2241 











 DD/DT: 03/24/20 2240 


 TD/TT: 





- Medical Decision Making





Patient is a 29-year-old female presents emergency room with complaints of URI 

symptoms that began yesterday.  she states the fever began today. She has 

associated cough, headache, chills, generalized body ache, fever, nausea.  She 

denies any shortness of breath, chest pain, vomiting, diarrhea, urinary 

symptoms, abdominal pain.  She has a past medical history of hypertension.  She 

has an allergy to Rocephin.  She denies any sick contacts or recent travel or 

recent surgeries.  Patient was evaluated in the emergency department yesterday 

early morning with similar symptoms but did not have a fever at that time and 

had a chest x-ray performed with no acute process, labs did show some decrease 

in the white blood cell count and platelet at that time.  Vitals with elevated 

temp and blood pressure.  Chest x-ray with no acute process.  Rapid flu is 

negative.  Labs significant for neutropenia at 2.3, lymphocytopenia at 0.5, 

thrombocytopenia at 88.  hCG is negative.  Patient given 1 L IV fluid, Benadryl,

 Reglan, Tylenol.Discussed case with Dr. Christian, ER attending regarding 

patient history and results recommended admission to the hospitalist service. 

Spoke with Dr. Macdonald, hospitalist regarding patient history and results, advised

 to place on droplet precautions and to submit COVID 19 form to the CDC, will 

accept and resume care of patient, will admit patient to the hospital. CDC form 

filled out and scanned into chart by registration. pt admitted to hospitalist 

service on contact/droplet precautions. 





- Differential Diagnosis


URI, PNA, Influenza, acute bronchitis, COVID 19, viral syndrome


Critical care attestation.: 


If time is entered above; I have spent that time in minutes in the direct care 

of this critically ill patient, excluding procedure time.








ED Disposition


Is pt being admited?: Yes


Does the pt Need Aspirin: No


Time of Disposition: 23:24

## 2020-03-24 NOTE — XRAY REPORT
CHEST 2 VIEWS 



INDICATION / CLINICAL INFORMATION:

cough, fever.



COMPARISON: 

Chest radiograph 3/23/2020



FINDINGS:



SUPPORT DEVICES: None.



HEART / MEDIASTINUM: No significant abnormality. 



LUNGS / PLEURA: No significant pulmonary or pleural abnormality. No pneumothorax. 



ADDITIONAL FINDINGS: No significant additional findings.



IMPRESSION:

No acute finding. No significant change.



Signer Name: Bhanu Pathak MD 

Signed: 3/24/2020 10:41 PM

Workstation Name: [id number]

## 2020-03-25 LAB
ANISOCYTOSIS BLD QL SMEAR: (no result)
BAND NEUTROPHILS # (MANUAL): 0 K/MM3
BUN SERPL-MCNC: 8 MG/DL (ref 7–17)
BUN/CREAT SERPL: 10 %
CALCIUM SERPL-MCNC: 8.7 MG/DL (ref 8.4–10.2)
HCT VFR BLD CALC: 43.7 % (ref 30.3–42.9)
HEMOLYSIS INDEX: 5
HGB BLD-MCNC: 14.1 GM/DL (ref 10.1–14.3)
MCHC RBC AUTO-ENTMCNC: 32 % (ref 30–34)
MCV RBC AUTO: 82 FL (ref 79–97)
MYELOCYTES # (MANUAL): 0 K/MM3
PLATELET # BLD: 91 K/MM3 (ref 140–440)
PROMYELOCYTES # (MANUAL): 0 K/MM3
RBC # BLD AUTO: 5.31 M/MM3 (ref 3.65–5.03)
TOTAL CELLS COUNTED BLD: 100

## 2020-03-25 RX ADMIN — OXYCODONE AND ACETAMINOPHEN PRN TAB: 5; 325 TABLET ORAL at 01:57

## 2020-03-25 RX ADMIN — Medication SCH ML: at 21:17

## 2020-03-25 RX ADMIN — SODIUM CHLORIDE SCH MLS/HR: 0.45 INJECTION, SOLUTION INTRAVENOUS at 17:42

## 2020-03-25 RX ADMIN — ENOXAPARIN SODIUM SCH MG: 100 INJECTION SUBCUTANEOUS at 10:17

## 2020-03-25 RX ADMIN — Medication SCH ML: at 10:18

## 2020-03-25 RX ADMIN — OXYCODONE AND ACETAMINOPHEN PRN TAB: 5; 325 TABLET ORAL at 17:40

## 2020-03-25 RX ADMIN — NAPROXEN SCH MG: 500 TABLET ORAL at 21:16

## 2020-03-25 NOTE — CONSULTATION
History of Present Illness





- Reason for Consult


Consult date: 03/25/20





- History of Present Illness





29-year-old female past medical history hypertension admitted to the hospital 

with complaints of fever, chills, myalgias, and cough.  She also complains of 

fatigue, headache, nausea.  She otherwise denies shortness of breath or 

abdominal pain.  She was recently seen here in the hospital on 22 March for 

tooth pain, and on the 23rd for palpitations.  Chest x-ray at that time showed 

no acute abnormality.





Febrile to 100.5 degrees, low white count of 2.5 and associated lymphopenia.  

She is currently receiving levofloxacin.  Flu test is negative.  Blood cultures 

are currently pending as are urine cultures.





Imaging personally reviewed:


Chest x-ray: No acute abnormality





Review of Systems: Bold if positive, otherwise negative


General: fevers, chills, rigors


HEENT: visual disturbance, diplopia, eye pain


Respiratory: cough, sputum, hemoptysis, shortness of breath


Cardiovascular: chest pain, syncope


Gastrointestinal: nausea, vomiting, diarrhea, abdominal pain


Genitourinary: dysuria, hematuria, flank pain


Musculoskeletal: neck pain, back pain, joint pain, edema 


Neurologic: headaches, seizures


Hematologic: easy bruising or bleeding


Endocrine: night sweats, acute weight loss


Skin: rash, jaundice, redness


Psychiatric: suicidal, homicidal ideation





Medications and Allergies


                                    Allergies











Allergy/AdvReac Type Severity Reaction Status Date / Time


 


ceftriaxone [From Rocephin] Allergy  Unknown Verified 04/26/19 17:51











                                Home Medications











 Medication  Instructions  Recorded  Confirmed  Last Taken  Type


 


Clindamycin [Clindamycin CAP] 300 mg PO Q8H 10 Days #30 cap 12/25/18  Unknown Rx


 


Omeprazole 40 mg PO DAILY #30 capsule. 04/26/19  Unknown Rx


 


Sucralfate [Carafate] 1 gm PO ACHS 7 Days #28 tablet 04/26/19  Unknown Rx


 


levoFLOXacin [Levaquin TAB] 500 mg PO QDAY #10 tablet 04/26/19  Unknown Rx


 


Naproxen 500 mg PO BID 7 Days #14 tablet 11/20/19  Unknown Rx


 


methOCARBAMOL [Robaxin TAB] 500 mg PO Q6H #20 tablet 11/20/19  Unknown Rx


 


Clindamycin [Clindamycin CAP] 450 mg PO TID 7 Days #63 capsule 03/22/20  Unknown

Rx


 


Ibuprofen [Motrin 600 MG tab] 600 mg PO Q8H PRN #20 tablet 03/22/20  Unknown Rx


 


Penicillin Vk [Veetids TAB] 500 mg PO QID 7 Days #56 tablet 03/22/20  Unknown Rx


 


traMADoL [Ultram 50 MG tab] 50 mg PO Q6HR PRN #7 tablet 03/22/20  Unknown Rx


 


amLODIPine 10 mg PO DAILY #30 tablet 03/23/20  Unknown Rx











Active Meds: 


Active Medications





Acetaminophen (Tylenol)  650 mg PO Q4H PRN


   PRN Reason: Pain MILD(1-3)/Fever >100.5/HA


Enoxaparin Sodium (Enoxaparin)  40 mg SUB-Q QDAY [facility]


   Last Admin: 03/25/20 10:17 Dose:  40 mg


   Documented by: 


Hydralazine HCl (Apresoline)  5 mg IV Q6H PRN


   PRN Reason: Hypertension


Sodium Chloride (Nacl 0.45% 1000 Ml)  1,000 mls @ 125 mls/hr IV AS DIRECT MARGAUX


Levofloxacin/Dextrose (Levaquin 500mg/100ml)  500 mg in 100 mls @ 100 mls/hr IV 

Q24HR [facility]; Protocol


   Last Admin: 03/25/20 10:17 Dose:  100 mls/hr


   Documented by: 


Metoclopramide HCl (Reglan)  10 mg IV Q6H PRN


   PRN Reason: Nausea And Vomiting


Ondansetron HCl (Zofran)  4 mg IV Q8H PRN


   PRN Reason: Nausea And Vomiting


Oxycodone/Acetaminophen (Percocet 5/325)  1 tab PO Q4H PRN


   PRN Reason: Pain, Moderate (4-6)


   Last Admin: 03/25/20 01:57 Dose:  1 tab


   Documented by: 


Sodium Chloride (Sodium Chloride Flush Syringe 10 Ml)  10 ml IV BID [facility]


   Last Admin: 03/25/20 10:18 Dose:  10 ml


   Documented by: 


Sodium Chloride (Sodium Chloride Flush Syringe 10 Ml)  10 ml IV PRN PRN


   PRN Reason: LINE FLUSH











Physical Examination





- Physical Exam


Narrative exam: 





Physical Exam: 


Constitutional: Alert, cooperative. No acute distress.  Obese


Head, Ears, Nose: Normocephalic, atraumatic. External ears, nose normal


Eyes: Conjunctivae/corneas clear. No icterus. No ptosis.


Neck: Supple, no meningeal signs


Oral: dentition fair, no thrush


Cardiovascular: S1, S2 normal. 


Respiratory: Good air entry, clear to auscultation bilaterally


GI: Soft, non-tender; bowel sounds normal. No peritoneal signs. 


Musculoskeletal: No pedal edema, no cyanosis.


Skin: No rash or abscess


Hem/Lymphatic: No palpable cervical or supraclavicular nodes. No lymphangitis


Psych: Mood ok. Affect normal


Neurological: Awake, alert, oriented. No gross abnormality





- Constitutional


Vitals: 


                                   Vital Signs











Temp Pulse Resp BP Pulse Ox


 


 98.6 F   55 L  22   140/88   97 


 


 03/25/20 04:58  03/25/20 15:00  03/25/20 16:55  03/25/20 15:00  03/25/20 15:00








                           Temperature -Last 24 Hours











Temperature                    98.6 F


 


Temperature                    100.5 F


 


Temperature                    100.3 F

















Results





- Labs


CBC & Chem 7: 


                                 03/25/20 04:11





                                 03/25/20 04:11


Labs: 


                              Abnormal lab results











  03/24/20 03/24/20 03/24/20 Range/Units





  19:27 19:27 19:27 


 


WBC  2.3 L    (4.5-11.0)  K/mm3


 


RBC  5.50 H    (3.65-5.03)  M/mm3


 


Hgb  14.4 H    (10.1-14.3)  gm/dl


 


Hct  45.5 H    (30.3-42.9)  %


 


MCH  26 L    (28-32)  pg


 


RDW  16.0 H    (13.2-15.2)  %


 


Plt Count  88 L    (140-440)  K/mm3


 


Monocytes % (Manual)     (0.0-7.3)  %


 


Seg Neutrophils # Man     (1.8-7.7)  K/mm3


 


Lymphocytes # (Manual)     (1.2-5.4)  K/mm3


 


Sodium   133 L   (137-145)  mmol/L


 


Chloride   96.5 L   ()  mmol/L


 


Carbon Dioxide   20 L D   (22-30)  mmol/L


 


Albumin    3.8 L  (3.9-5)  g/dL


 


Urine WBC (Auto)     (0.0-6.0)  /HPF














  03/24/20 03/24/20 03/25/20 Range/Units





  Unknown Unknown 04:11 


 


WBC   2.3 L  2.5 L  (4.5-11.0)  K/mm3


 


RBC   5.41 H  5.31 H  (3.65-5.03)  M/mm3


 


Hgb     (10.1-14.3)  gm/dl


 


Hct   44.6 H  43.7 H  (30.3-42.9)  %


 


MCH   26 L  27 L  (28-32)  pg


 


RDW   16.2 H  16.2 H  (13.2-15.2)  %


 


Plt Count   97 L  91 L  (140-440)  K/mm3


 


Monocytes % (Manual)    17.0 H  (0.0-7.3)  %


 


Seg Neutrophils # Man   1.5 L  1.5 L  (1.8-7.7)  K/mm3


 


Lymphocytes # (Manual)   0.5 L  0.6 L  (1.2-5.4)  K/mm3


 


Sodium     (137-145)  mmol/L


 


Chloride     ()  mmol/L


 


Carbon Dioxide     (22-30)  mmol/L


 


Albumin     (3.9-5)  g/dL


 


Urine WBC (Auto)  8.0 H    (0.0-6.0)  /HPF














  03/25/20 Range/Units





  04:11 


 


WBC   (4.5-11.0)  K/mm3


 


RBC   (3.65-5.03)  M/mm3


 


Hgb   (10.1-14.3)  gm/dl


 


Hct   (30.3-42.9)  %


 


MCH   (28-32)  pg


 


RDW   (13.2-15.2)  %


 


Plt Count   (140-440)  K/mm3


 


Monocytes % (Manual)   (0.0-7.3)  %


 


Seg Neutrophils # Man   (1.8-7.7)  K/mm3


 


Lymphocytes # (Manual)   (1.2-5.4)  K/mm3


 


Sodium   (137-145)  mmol/L


 


Chloride  96.7 L  ()  mmol/L


 


Carbon Dioxide   (22-30)  mmol/L


 


Albumin   (3.9-5)  g/dL


 


Urine WBC (Auto)   (0.0-6.0)  /HPF














Assessment and Plan





Cultures:


Blood culture 3/25/2020 pending


Urine culture 3/24/2020 pending





A/P: 39-year-old female past medical history hypertension, obesity admitted to 

the hospital with URI symptoms concerning for COVID-19.








#COVID-19 rule out: Patient with consistent URI symptoms, though no acute 

pneumonia seen on chest x-ray.  The patient is leukopenic, the patient's with 

poor immune system can sometimes have trouble creating the information required 

to be seen on chest x-ray.  Survey already filled out, follow-up test results 

with [facility]





#URI: Ordered procalcitonin, continue levofloxacin for now pending results.





#Fevers: Possibly due to pneumonia as per above, denies any other symptoms 

including dysuria or symptoms concerning for urinary tract infection.





#Leukopenia: Agree with sending test for HIV, already obtained and sent to lab.





Recs:


-Follow-up [facility] testing for COVID-19


-Continue levofloxacin for now to complete 5 days per possible community-a

cquired pneumonia


-Ordered procalcitonin for morning labs


-Ordered blood cultures


-Follow-up urine cultures


-Follow-up HIV testing





Thank for the consult, will continue to follow





MD [facility] ([facility])


M: [phone]


O: [phone]


F: [phone]

## 2020-03-25 NOTE — PROGRESS NOTE
Assessment and Plan


Assessment and plan: 


--Upper respiratory symptoms, rule out COVID


Patient will be placed on droplet and contact precautions


COVID forms were filled out, consult ID





--Febrile illness; low-grade fever





--Hypertension


IV hydralazine for control,, continue outpatient medications





--Chronic  leukopenia





--Thrombocytopenia; due to chronic illness


No evidence of bleeding, closely monitor


Transfuse platelets as needed





--Urinary tract infection


Start IV Levaquin, follow culture





--DVT prophylax; Lovenox





To closely and adjust management as needed


Plan of care reviewed with the patient


And her nurse











History


Interval history: 


Patient seen and examined at the bedside in isolation room


Patient's chart and other medical records reviewed


Patient complains of mild shortness of breath and cough


Denies chest pain


Alert awake oriented


Vital signs noted





Hospitalist Physical





- Constitutional


Vitals: 


                                        











Temp Pulse Resp BP Pulse Ox


 


 98.6 F   60   19   107/55   98 


 


 03/25/20 04:58  03/25/20 06:30  03/25/20 06:30  03/25/20 06:30  03/25/20 06:30











General appearance: Present: mild distress, well-nourished, obese





- EENT


Eyes: Present: PERRL, EOM intact





- Neck


Neck: Present: supple, normal ROM





- Respiratory


Respiratory effort: normal


Respiratory: bilateral: diminished, rhonchi, negative: rales, wheezing





- Cardiovascular


Rhythm: regular


Heart Sounds: Present: S1 & S2





- Extremities


Extremities: no ischemia, No edema





- Abdominal


General gastrointestinal: soft, non-tender, non-distended, normal bowel sounds





- Integumentary


Integumentary: Present: clear, warm





- Psychiatric


Psychiatric: appropriate mood/affect, cooperative





- Neurologic


Neurologic: CNII-XII intact, moves all extremities





Results





- Labs


CBC & Chem 7: 


                                 03/25/20 04:11





                                 03/25/20 04:11


Labs: 


                             Laboratory Last Values











WBC  2.5 K/mm3 (4.5-11.0)  L  03/25/20  04:11    


 


RBC  5.31 M/mm3 (3.65-5.03)  H  03/25/20  04:11    


 


Hgb  14.1 gm/dl (10.1-14.3)   03/25/20  04:11    


 


Hct  43.7 % (30.3-42.9)  H  03/25/20  04:11    


 


MCV  82 fl (79-97)   03/25/20  04:11    


 


MCH  27 pg (28-32)  L  03/25/20  04:11    


 


MCHC  32 % (30-34)   03/25/20  04:11    


 


RDW  16.2 % (13.2-15.2)  H  03/25/20  04:11    


 


Plt Count  91 K/mm3 (140-440)  L  03/25/20  04:11    


 


Mono % (Auto)  Np   03/25/20  04:11    


 


Add Manual Diff  Complete   03/25/20  04:11    


 


Total Counted  100   03/25/20  04:11    


 


Seg Neuts % (Manual)  60.0 % (40.0-70.0)   03/25/20  04:11    


 


Band Neutrophils %  0 %  03/25/20  04:11    


 


Lymphocytes % (Manual)  22.0 % (13.4-35.0)   03/25/20  04:11    


 


Reactive Lymphs % (Man)  0 %  03/25/20  04:11    


 


Monocytes % (Manual)  17.0 % (0.0-7.3)  H  03/25/20  04:11    


 


Eosinophils % (Manual)  1.0 % (0.0-4.3)   03/25/20  04:11    


 


Basophils % (Manual)  0 % (0.0-1.8)   03/25/20  04:11    


 


Metamyelocytes %  0 %  03/25/20  04:11    


 


Myelocytes %  0 %  03/25/20  04:11    


 


Promyelocytes %  0 %  03/25/20  04:11    


 


Blast Cells %  0 %  03/25/20  04:11    


 


Nucleated RBC %  Not Reportable   03/25/20  04:11    


 


Seg Neutrophils # Man  1.5 K/mm3 (1.8-7.7)  L  03/25/20  04:11    


 


Band Neutrophils #  0.0 K/mm3  03/25/20  04:11    


 


Lymphocytes # (Manual)  0.6 K/mm3 (1.2-5.4)  L  03/25/20  04:11    


 


Abs React Lymphs (Man)  0.0 K/mm3  03/25/20  04:11    


 


Monocytes # (Manual)  0.4 K/mm3 (0.0-0.8)   03/25/20  04:11    


 


Eosinophils # (Manual)  0.0 K/mm3 (0.0-0.4)   03/25/20  04:11    


 


Basophils # (Manual)  0.0 K/mm3 (0.0-0.1)   03/25/20  04:11    


 


Metamyelocytes #  0.0 K/mm3  03/25/20  04:11    


 


Myelocytes #  0.0 K/mm3  03/25/20  04:11    


 


Promyelocytes #  0.0 K/mm3  03/25/20  04:11    


 


Blast Cells #  0.0 K/mm3  03/25/20  04:11    


 


WBC Morphology  Not Reportable   03/25/20  04:11    


 


Hypersegmented Neuts  Not Reportable   03/25/20  04:11    


 


Hyposegmented Neuts  Not Reportable   03/25/20  04:11    


 


Hypogranular Neuts  Not Reportable   03/25/20  04:11    


 


Smudge Cells  Not Reportable   03/25/20  04:11    


 


Toxic Granulation  Not Reportable   03/25/20  04:11    


 


Toxic Vacuolation  Not Reportable   03/25/20  04:11    


 


Dohle Bodies  Not Reportable   03/25/20  04:11    


 


Pelger-Huet Anomaly  Not Reportable   03/25/20  04:11    


 


Gina Rods  Not Reportable   03/25/20  04:11    


 


Platelet Estimate  Consistent w auto   03/25/20  04:11    


 


Clumped Platelets  Not Reportable   03/25/20  04:11    


 


Plt Clumps, EDTA  Not Reportable   03/25/20  04:11    


 


Large Platelets  Not Reportable   03/25/20  04:11    


 


Giant Platelets  Not Reportable   03/25/20  04:11    


 


Platelet Satelliting  Not Reportable   03/25/20  04:11    


 


Plt Morphology Comment  Not Reportable   03/25/20  04:11    


 


RBC Morphology  Not Reportable   03/25/20  04:11    


 


Dimorphic RBCs  Not Reportable   03/25/20  04:11    


 


Polychromasia  Not Reportable   03/25/20  04:11    


 


Hypochromasia  Not Reportable   03/25/20  04:11    


 


Poikilocytosis  Not Reportable   03/25/20  04:11    


 


Anisocytosis  1+   03/25/20  04:11    


 


Microcytosis  Not Reportable   03/25/20  04:11    


 


Macrocytosis  Not Reportable   03/25/20  04:11    


 


Spherocytes  Not Reportable   03/25/20  04:11    


 


Pappenheimer Bodies  Not Reportable   03/25/20  04:11    


 


Sickle Cells  Not Reportable   03/25/20  04:11    


 


Target Cells  Not Reportable   03/25/20  04:11    


 


Tear Drop Cells  Not Reportable   03/25/20  04:11    


 


Ovalocytes  Not Reportable   03/25/20  04:11    


 


Helmet Cells  Not Reportable   03/25/20  04:11    


 


Ugalde-Polk Bodies  Not Reportable   03/25/20  04:11    


 


Cabot Rings  Not Reportable   03/25/20  04:11    


 


Hesston Cells  Not Reportable   03/25/20  04:11    


 


Bite Cells  Not Reportable   03/25/20  04:11    


 


Crenated Cell  Not Reportable   03/25/20  04:11    


 


Elliptocytes  Not Reportable   03/25/20  04:11    


 


Acanthocytes (Spur)  Not Reportable   03/25/20  04:11    


 


Rouleaux  Not Reportable   03/25/20  04:11    


 


Hemoglobin C Crystals  Not Reportable   03/25/20  04:11    


 


Schistocytes  Not Reportable   03/25/20  04:11    


 


Malaria parasites  Not Reportable   03/25/20  04:11    


 


Bandar Bodies  Not Reportable   03/25/20  04:11    


 


Hem Pathologist Commnt  No   03/25/20  04:11    


 


Sodium  137 mmol/L (137-145)   03/25/20  04:11    


 


Potassium  3.9 mmol/L (3.6-5.0)   03/25/20  04:11    


 


Chloride  96.7 mmol/L ()  L  03/25/20  04:11    


 


Carbon Dioxide  26 mmol/L (22-30)   03/25/20  04:11    


 


Anion Gap  18 mmol/L  03/25/20  04:11    


 


BUN  8 mg/dL (7-17)   03/25/20  04:11    


 


Creatinine  0.8 mg/dL (0.7-1.2)   03/25/20  04:11    


 


Estimated GFR  > 60 ml/min  03/25/20  04:11    


 


BUN/Creatinine Ratio  10 %  03/25/20  04:11    


 


Glucose  67 mg/dL ()   03/25/20  04:11    


 


Calcium  8.7 mg/dL (8.4-10.2)   03/25/20  04:11    


 


Total Bilirubin  0.20 mg/dL (0.1-1.2)   03/24/20  19:27    


 


Direct Bilirubin  < 0.2 mg/dL (0-0.2)   03/24/20  19:27    


 


Indirect Bilirubin  0.0 mg/dL  03/24/20  19:27    


 


AST  26 units/L (5-40)   03/24/20  19:27    


 


ALT  13 units/L (7-56)   03/24/20  19:27    


 


Alkaline Phosphatase  97 units/L ()   03/24/20  19:27    


 


Total Protein  7.1 g/dL (6.3-8.2)   03/24/20  19:27    


 


Albumin  3.8 g/dL (3.9-5)  L  03/24/20  19:27    


 


Albumin/Globulin Ratio  1.2 %  03/24/20  19:27    


 


HCG, Qual  Negative  (Negative)   03/24/20  20:49    


 


Urine Color  Yellow  (Yellow)   03/24/20  Unknown


 


Urine Turbidity  Clear  (Clear)   03/24/20  Unknown


 


Urine pH  6.0  (5.0-7.0)   03/24/20  Unknown


 


Ur Specific Gravity  1.020  (1.003-1.030)   03/24/20  Unknown


 


Urine Protein  <15 mg/dl mg/dL (Negative)   03/24/20  Unknown


 


Urine Glucose (UA)  Neg mg/dL (Negative)   03/24/20  Unknown


 


Urine Ketones  Neg mg/dL (Negative)   03/24/20  Unknown


 


Urine Blood  Neg  (Negative)   03/24/20  Unknown


 


Urine Nitrite  Neg  (Negative)   03/24/20  Unknown


 


Urine Bilirubin  Neg  (Negative)   03/24/20  Unknown


 


Urine Urobilinogen  2.0 mg/dL (<2.0)   03/24/20  Unknown


 


Ur Leukocyte Esterase  Sm  (Negative)   03/24/20  Unknown


 


Urine WBC (Auto)  8.0 /HPF (0.0-6.0)  H  03/24/20  Unknown


 


Urine RBC (Auto)  6.0 /HPF (0.0-6.0)   03/24/20  Unknown


 


U Epithel Cells (Auto)  3.0 /HPF (0-13.0)   03/24/20  Unknown


 


Urine Mucus  2+ /HPF  03/24/20  Unknown


 


Urine HCG, Qual  Negative  (Negative)   03/24/20  Unknown


 


Influenza A (Rapid)  Negative  (Negative)   03/24/20  Unknown


 


Influenza B (Rapid)  Negative  (Negative)   03/24/20  Unknown














Active Medications





- Current Medications


Current Medications: 














Generic Name Dose Route Start Last Admin





  Trade Name Freq  PRN Reason Stop Dose Admin


 


Acetaminophen  650 mg  03/25/20 00:10 





  Tylenol  PO  





  Q4H PRN  





  Pain MILD(1-3)/Fever >100.5/HA  


 


Enoxaparin Sodium  40 mg  03/25/20 10:00 





  Enoxaparin  SUB-Q  





  QDAY [facility]  


 


Hydralazine HCl  5 mg  03/25/20 00:15 





  Apresoline  IV  





  Q6H PRN  





  Hypertension  


 


Sodium Chloride  1,000 mls @ 125 mls/hr  03/25/20 01:00 





  Nacl 0.45% 1000 Ml  IV  





  AS DIRECT MARGAUX  


 


Levofloxacin/Dextrose  500 mg in 100 mls @ 100 mls/hr  03/25/20 10:00 





  Levaquin 500mg/100ml  IV  





  Q24HR [facility]  





  Protocol  


 


Metoclopramide HCl  10 mg  03/24/20 20:47 





  Reglan  IV  





  Q6H PRN  





  Nausea And Vomiting  


 


Ondansetron HCl  4 mg  03/25/20 00:10 





  Zofran  IV  





  Q8H PRN  





  Nausea And Vomiting  


 


Oxycodone/Acetaminophen  1 tab  03/25/20 00:10  03/25/20 01:57





  Percocet 5/325  PO   1 tab





  Q4H PRN   Administration





  Pain, Moderate (4-6)  


 


Sodium Chloride  10 ml  03/25/20 10:00 





  Sodium Chloride Flush Syringe 10 Ml  IV  





  BID MARGAUX  


 


Sodium Chloride  10 ml  03/25/20 00:10 





  Sodium Chloride Flush Syringe 10 Ml  IV  





  PRN PRN  





  LINE FLUSH

## 2020-03-26 RX ADMIN — NAPROXEN SCH MG: 500 TABLET ORAL at 22:27

## 2020-03-26 RX ADMIN — Medication SCH: at 22:32

## 2020-03-26 RX ADMIN — ENOXAPARIN SODIUM SCH MG: 100 INJECTION SUBCUTANEOUS at 10:14

## 2020-03-26 RX ADMIN — OXYCODONE AND ACETAMINOPHEN PRN TAB: 5; 325 TABLET ORAL at 20:20

## 2020-03-26 RX ADMIN — Medication SCH ML: at 10:15

## 2020-03-26 RX ADMIN — SODIUM CHLORIDE SCH MLS/HR: 0.45 INJECTION, SOLUTION INTRAVENOUS at 18:18

## 2020-03-26 RX ADMIN — NAPROXEN SCH MG: 500 TABLET ORAL at 10:14

## 2020-03-26 NOTE — PROGRESS NOTE
Assessment and Plan


Assessment and plan: 


--Febrile illness; low-grade fever


To rule out Covid 19, forms filled out


ID evaluated the patient





--Upper respiratory symptoms, rule out COVID


Patient will be placed on droplet and contact precautions


Empiric antibiotics, ID following





--Hypertension


IV hydralazine for control,, continue outpatient medications





--Chronic  leukopenia





--Thrombocytopenia; due to chronic illness


No evidence of bleeding, closely monitor


Transfuse platelets as needed





--Urinary tract infection


Start IV Levaquin, follow culture





--DVT prophylax; Lovenox





Follow clinically and adjust as needed


Contact and droplet isolation implemented





Plan of care reviewed with the patient and her nurse








History


Interval history: 


Possible Covidien 19 patient


On contact and droplet isolation


Patient seen and examined at the bedside


Patient's chart medications labs reviewed


Patient complains of generalized weakness


Vital signs reviewed





Hospitalist Physical





- Constitutional


Vitals: 


                                        











Temp Pulse Resp BP Pulse Ox


 


 98.4 F   58 L  20   119/71   99 


 


 03/26/20 15:20  03/26/20 15:20  03/26/20 15:20  03/26/20 15:20  03/26/20 15:20











General appearance: Present: mild distress, well-nourished, obese





- EENT


Eyes: Present: PERRL, EOM intact





- Neck


Neck: Present: supple, normal ROM





- Respiratory


Respiratory effort: normal


Respiratory: bilateral: diminished, negative: rales, rhonchi, wheezing





- Cardiovascular


Rhythm: regular


Heart Sounds: Present: S1 & S2





- Extremities


Extremities: no ischemia, No edema





- Abdominal


General gastrointestinal: soft, non-tender, non-distended, normal bowel sounds





- Integumentary


Integumentary: Present: clear, warm





- Psychiatric


Psychiatric: appropriate mood/affect, cooperative





- Neurologic


Neurologic: moves all extremities





Results





- Labs


CBC & Chem 7: 


                                 03/25/20 04:11





                                 03/25/20 04:11


Labs: 


                             Laboratory Last Values











WBC  2.5 K/mm3 (4.5-11.0)  L  03/25/20  04:11    


 


RBC  5.31 M/mm3 (3.65-5.03)  H  03/25/20  04:11    


 


Hgb  14.1 gm/dl (10.1-14.3)   03/25/20  04:11    


 


Hct  43.7 % (30.3-42.9)  H  03/25/20  04:11    


 


MCV  82 fl (79-97)   03/25/20  04:11    


 


MCH  27 pg (28-32)  L  03/25/20  04:11    


 


MCHC  32 % (30-34)   03/25/20  04:11    


 


RDW  16.2 % (13.2-15.2)  H  03/25/20  04:11    


 


Plt Count  91 K/mm3 (140-440)  L  03/25/20  04:11    


 


Mono % (Auto)  Np   03/25/20  04:11    


 


Add Manual Diff  Complete   03/25/20  04:11    


 


Total Counted  100   03/25/20  04:11    


 


Seg Neuts % (Manual)  60.0 % (40.0-70.0)   03/25/20  04:11    


 


Band Neutrophils %  0 %  03/25/20  04:11    


 


Lymphocytes % (Manual)  22.0 % (13.4-35.0)   03/25/20  04:11    


 


Reactive Lymphs % (Man)  0 %  03/25/20  04:11    


 


Monocytes % (Manual)  17.0 % (0.0-7.3)  H  03/25/20  04:11    


 


Eosinophils % (Manual)  1.0 % (0.0-4.3)   03/25/20  04:11    


 


Basophils % (Manual)  0 % (0.0-1.8)   03/25/20  04:11    


 


Metamyelocytes %  0 %  03/25/20  04:11    


 


Myelocytes %  0 %  03/25/20  04:11    


 


Promyelocytes %  0 %  03/25/20  04:11    


 


Blast Cells %  0 %  03/25/20  04:11    


 


Nucleated RBC %  Not Reportable   03/25/20  04:11    


 


Seg Neutrophils # Man  1.5 K/mm3 (1.8-7.7)  L  03/25/20  04:11    


 


Band Neutrophils #  0.0 K/mm3  03/25/20  04:11    


 


Lymphocytes # (Manual)  0.6 K/mm3 (1.2-5.4)  L  03/25/20  04:11    


 


Abs React Lymphs (Man)  0.0 K/mm3  03/25/20  04:11    


 


Monocytes # (Manual)  0.4 K/mm3 (0.0-0.8)   03/25/20  04:11    


 


Eosinophils # (Manual)  0.0 K/mm3 (0.0-0.4)   03/25/20  04:11    


 


Basophils # (Manual)  0.0 K/mm3 (0.0-0.1)   03/25/20  04:11    


 


Metamyelocytes #  0.0 K/mm3  03/25/20  04:11    


 


Myelocytes #  0.0 K/mm3  03/25/20  04:11    


 


Promyelocytes #  0.0 K/mm3  03/25/20  04:11    


 


Blast Cells #  0.0 K/mm3  03/25/20  04:11    


 


WBC Morphology  Not Reportable   03/25/20  04:11    


 


Hypersegmented Neuts  Not Reportable   03/25/20  04:11    


 


Hyposegmented Neuts  Not Reportable   03/25/20  04:11    


 


Hypogranular Neuts  Not Reportable   03/25/20  04:11    


 


Smudge Cells  Not Reportable   03/25/20  04:11    


 


Toxic Granulation  Not Reportable   03/25/20  04:11    


 


Toxic Vacuolation  Not Reportable   03/25/20  04:11    


 


Dohle Bodies  Not Reportable   03/25/20  04:11    


 


Pelger-Huet Anomaly  Not Reportable   03/25/20  04:11    


 


Gina Rods  Not Reportable   03/25/20  04:11    


 


Platelet Estimate  Consistent w auto   03/25/20  04:11    


 


Clumped Platelets  Not Reportable   03/25/20  04:11    


 


Plt Clumps, EDTA  Not Reportable   03/25/20  04:11    


 


Large Platelets  Not Reportable   03/25/20  04:11    


 


Giant Platelets  Not Reportable   03/25/20  04:11    


 


Platelet Satelliting  Not Reportable   03/25/20  04:11    


 


Plt Morphology Comment  Not Reportable   03/25/20  04:11    


 


RBC Morphology  Not Reportable   03/25/20  04:11    


 


Dimorphic RBCs  Not Reportable   03/25/20  04:11    


 


Polychromasia  Not Reportable   03/25/20  04:11    


 


Hypochromasia  Not Reportable   03/25/20  04:11    


 


Poikilocytosis  Not Reportable   03/25/20  04:11    


 


Anisocytosis  1+   03/25/20  04:11    


 


Microcytosis  Not Reportable   03/25/20  04:11    


 


Macrocytosis  Not Reportable   03/25/20  04:11    


 


Spherocytes  Not Reportable   03/25/20  04:11    


 


Pappenheimer Bodies  Not Reportable   03/25/20  04:11    


 


Sickle Cells  Not Reportable   03/25/20  04:11    


 


Target Cells  Not Reportable   03/25/20  04:11    


 


Tear Drop Cells  Not Reportable   03/25/20  04:11    


 


Ovalocytes  Not Reportable   03/25/20  04:11    


 


Helmet Cells  Not Reportable   03/25/20  04:11    


 


Ugalde-Alafaya Bodies  Not Reportable   03/25/20  04:11    


 


Cabot Rings  Not Reportable   03/25/20  04:11    


 


Samuel Cells  Not Reportable   03/25/20  04:11    


 


Bite Cells  Not Reportable   03/25/20  04:11    


 


Crenated Cell  Not Reportable   03/25/20  04:11    


 


Elliptocytes  Not Reportable   03/25/20  04:11    


 


Acanthocytes (Spur)  Not Reportable   03/25/20  04:11    


 


Rouleaux  Not Reportable   03/25/20  04:11    


 


Hemoglobin C Crystals  Not Reportable   03/25/20  04:11    


 


Schistocytes  Not Reportable   03/25/20  04:11    


 


Malaria parasites  Not Reportable   03/25/20  04:11    


 


Bandar Bodies  Not Reportable   03/25/20  04:11    


 


Hem Pathologist Commnt  No   03/25/20  04:11    


 


Sodium  137 mmol/L (137-145)   03/25/20  04:11    


 


Potassium  3.9 mmol/L (3.6-5.0)   03/25/20  04:11    


 


Chloride  96.7 mmol/L ()  L  03/25/20  04:11    


 


Carbon Dioxide  26 mmol/L (22-30)   03/25/20  04:11    


 


Anion Gap  18 mmol/L  03/25/20  04:11    


 


BUN  8 mg/dL (7-17)   03/25/20  04:11    


 


Creatinine  0.8 mg/dL (0.7-1.2)   03/25/20  04:11    


 


Estimated GFR  > 60 ml/min  03/25/20  04:11    


 


BUN/Creatinine Ratio  10 %  03/25/20  04:11    


 


Glucose  67 mg/dL ()   03/25/20  04:11    


 


Calcium  8.7 mg/dL (8.4-10.2)   03/25/20  04:11    


 


Total Bilirubin  0.20 mg/dL (0.1-1.2)   03/24/20  19:27    


 


Direct Bilirubin  < 0.2 mg/dL (0-0.2)   03/24/20  19:27    


 


Indirect Bilirubin  0.0 mg/dL  03/24/20  19:27    


 


AST  26 units/L (5-40)   03/24/20  19:27    


 


ALT  13 units/L (7-56)   03/24/20  19:27    


 


Alkaline Phosphatase  97 units/L ()   03/24/20  19:27    


 


Total Protein  7.1 g/dL (6.3-8.2)   03/24/20  19:27    


 


Albumin  3.8 g/dL (3.9-5)  L  03/24/20  19:27    


 


Albumin/Globulin Ratio  1.2 %  03/24/20  19:27    


 


Procalcitonin  < 0.05 ng/mL (<0.15)   03/25/20  20:54    


 


HCG, Qual  Negative  (Negative)   03/24/20  20:49    


 


Urine Color  Yellow  (Yellow)   03/24/20  Unknown


 


Urine Turbidity  Clear  (Clear)   03/24/20  Unknown


 


Urine pH  6.0  (5.0-7.0)   03/24/20  Unknown


 


Ur Specific Gravity  1.020  (1.003-1.030)   03/24/20  Unknown


 


Urine Protein  <15 mg/dl mg/dL (Negative)   03/24/20  Unknown


 


Urine Glucose (UA)  Neg mg/dL (Negative)   03/24/20  Unknown


 


Urine Ketones  Neg mg/dL (Negative)   03/24/20  Unknown


 


Urine Blood  Neg  (Negative)   03/24/20  Unknown


 


Urine Nitrite  Neg  (Negative)   03/24/20  Unknown


 


Urine Bilirubin  Neg  (Negative)   03/24/20  Unknown


 


Urine Urobilinogen  2.0 mg/dL (<2.0)   03/24/20  Unknown


 


Ur Leukocyte Esterase  Sm  (Negative)   03/24/20  Unknown


 


Urine WBC (Auto)  8.0 /HPF (0.0-6.0)  H  03/24/20  Unknown


 


Urine RBC (Auto)  6.0 /HPF (0.0-6.0)   03/24/20  Unknown


 


U Epithel Cells (Auto)  3.0 /HPF (0-13.0)   03/24/20  Unknown


 


Urine Mucus  2+ /HPF  03/24/20  Unknown


 


Urine HCG, Qual  Negative  (Negative)   03/24/20  Unknown


 


Influenza A (Rapid)  Negative  (Negative)   03/24/20  Unknown


 


Influenza B (Rapid)  Negative  (Negative)   03/24/20  Unknown











Microbiology: 


Microbiology





03/24/20 Unknown   Urine,Clean Catch   Urine Culture - Final


03/25/20 20:56   Peripheral/Venous   Blood Culture - Preliminary


                            Culture in Progress


03/25/20 20:56   Peripheral/Venous   Blood Culture - Preliminary


                            Culture in Progress








Petersen/IV: 


                                        





Voiding Method                   Toilet


IV Catheter Type [Right          INT / Saline Lock


Antecubital]                     











Active Medications





- Current Medications


Current Medications: 














Generic Name Dose Route Start Last Admin





  Trade Name Freq  PRN Reason Stop Dose Admin


 


Acetaminophen  650 mg  03/25/20 00:10 





  Tylenol  PO  





  Q4H PRN  





  Pain MILD(1-3)/Fever >100.5/HA  


 


Amlodipine Besylate  10 mg  03/26/20 10:00  03/26/20 10:15





  Amlodipine  PO   10 mg





  DAILY MARGAUX   Administration


 


Enoxaparin Sodium  40 mg  03/25/20 10:00  03/26/20 10:14





  Enoxaparin  SUB-Q   40 mg





  QDAY MARGAUX   Administration


 


Hydralazine HCl  5 mg  03/25/20 00:15 





  Apresoline  IV  





  Q6H PRN  





  Hypertension  


 


Sodium Chloride  1,000 mls @ 125 mls/hr  03/25/20 01:00  03/26/20 18:18





  Nacl 0.45% 1000 Ml  IV   125 mls/hr





  AS DIRECT MARGAUX   Administration


 


Levofloxacin  750 mg  03/27/20 10:00 





  Levaquin  PO  03/29/20 12:00 





  DAILY MRAGAUX  


 


Methocarbamol  500 mg  03/25/20 19:00  03/26/20 18:17





  Robaxin  PO   500 mg





  Q6H MARGAUX   Administration


 


Metoclopramide HCl  10 mg  03/24/20 20:47 





  Reglan  IV  





  Q6H PRN  





  Nausea And Vomiting  


 


Naproxen  500 mg  03/25/20 22:00  03/26/20 10:14





  Naproxen  PO   500 mg





  BID MARGAUX   Administration


 


Ondansetron HCl  4 mg  03/25/20 00:10 





  Zofran  IV  





  Q8H PRN  





  Nausea And Vomiting  


 


Oxycodone/Acetaminophen  1 tab  03/25/20 00:10  03/25/20 17:40





  Percocet 5/325  PO   1 tab





  Q4H PRN   Administration





  Pain, Moderate (4-6)  


 


Sodium Chloride  10 ml  03/25/20 10:00  03/26/20 10:15





  Sodium Chloride Flush Syringe 10 Ml  IV   10 ml





  BID MARGAUX   Administration


 


Sodium Chloride  10 ml  03/25/20 00:10 





  Sodium Chloride Flush Syringe 10 Ml  IV  





  PRN PRN  





  LINE FLUSH

## 2020-03-27 RX ADMIN — NAPROXEN SCH MG: 500 TABLET ORAL at 11:15

## 2020-03-27 RX ADMIN — SODIUM CHLORIDE SCH MLS/HR: 0.45 INJECTION, SOLUTION INTRAVENOUS at 11:22

## 2020-03-27 RX ADMIN — OXYCODONE AND ACETAMINOPHEN PRN TAB: 5; 325 TABLET ORAL at 21:37

## 2020-03-27 RX ADMIN — Medication SCH ML: at 11:05

## 2020-03-27 RX ADMIN — ENOXAPARIN SODIUM SCH MG: 100 INJECTION SUBCUTANEOUS at 11:05

## 2020-03-27 RX ADMIN — SODIUM CHLORIDE SCH MLS/HR: 0.45 INJECTION, SOLUTION INTRAVENOUS at 04:04

## 2020-03-27 RX ADMIN — Medication SCH ML: at 22:51

## 2020-03-27 RX ADMIN — NICOTINE SCH MG: 14 PATCH TRANSDERMAL at 11:05

## 2020-03-27 NOTE — PROGRESS NOTE
Assessment and Plan


Assessment and plan: 


--Febrile illness; low-grade fever


To rule out Covid 19, forms filled out


ID following





--Urinary tract infection


On IV Levaquin, follow culture


Supportive care





--Upper respiratory symptoms, rule out COVID


Patient will be placed on droplet and contact precautions


Empiric antibiotics, ID following





--Hypertension


IV hydralazine for control,, continue outpatient medications





--Chronic  leukopenia





--Thrombocytopenia; due to chronic illness


No evidence of bleeding, closely monitor


Transfuse platelets as needed





--DVT prophylax; Lovenox





Follow clinically and adjust as needed


Contact and droplet isolation implemented





Plan of care reviewed with the patient and her nurse


Follow Covir 19 test


Possible discharge home tomorrow if stable














History


Interval history: 


Patient seen and examined at bedside this morning


Patient's chart medications reviewed


Feels slightly better


Covid-19 report still pending


Mild shortness of breath and cough


Vital signs noted








Hospitalist Physical





- Constitutional


Vitals: 


                                        











Temp Pulse Resp BP Pulse Ox


 


 98.1 F   57 L  18   126/80   100 


 


 03/27/20 05:38  03/27/20 05:38  03/27/20 05:38  03/27/20 05:38  03/27/20 05:38











General appearance: Present: mild distress, well-nourished, obese





- EENT


Eyes: Present: PERRL, EOM intact





- Neck


Neck: Present: supple, normal ROM





- Respiratory


Respiratory effort: normal


Respiratory: bilateral: diminished, rhonchi, negative: rales, wheezing





- Cardiovascular


Rhythm: regular


Heart Sounds: Present: S1 & S2





- Extremities


Extremities: no ischemia, No edema





- Abdominal


General gastrointestinal: soft, non-tender, non-distended, normal bowel sounds





- Integumentary


Integumentary: Present: clear, warm





- Psychiatric


Psychiatric: appropriate mood/affect, cooperative





- Neurologic


Neurologic: CNII-XII intact, moves all extremities





Results





- Labs


CBC & Chem 7: 


                                 03/25/20 04:11





                                 03/25/20 04:11


Labs: 


                             Laboratory Last Values











WBC  2.5 K/mm3 (4.5-11.0)  L  03/25/20  04:11    


 


RBC  5.31 M/mm3 (3.65-5.03)  H  03/25/20  04:11    


 


Hgb  14.1 gm/dl (10.1-14.3)   03/25/20  04:11    


 


Hct  43.7 % (30.3-42.9)  H  03/25/20  04:11    


 


MCV  82 fl (79-97)   03/25/20  04:11    


 


MCH  27 pg (28-32)  L  03/25/20  04:11    


 


MCHC  32 % (30-34)   03/25/20  04:11    


 


RDW  16.2 % (13.2-15.2)  H  03/25/20  04:11    


 


Plt Count  91 K/mm3 (140-440)  L  03/25/20  04:11    


 


Mono % (Auto)  Np   03/25/20  04:11    


 


Add Manual Diff  Complete   03/25/20  04:11    


 


Total Counted  100   03/25/20  04:11    


 


Seg Neuts % (Manual)  60.0 % (40.0-70.0)   03/25/20  04:11    


 


Band Neutrophils %  0 %  03/25/20  04:11    


 


Lymphocytes % (Manual)  22.0 % (13.4-35.0)   03/25/20  04:11    


 


Reactive Lymphs % (Man)  0 %  03/25/20  04:11    


 


Monocytes % (Manual)  17.0 % (0.0-7.3)  H  03/25/20  04:11    


 


Eosinophils % (Manual)  1.0 % (0.0-4.3)   03/25/20  04:11    


 


Basophils % (Manual)  0 % (0.0-1.8)   03/25/20  04:11    


 


Metamyelocytes %  0 %  03/25/20  04:11    


 


Myelocytes %  0 %  03/25/20  04:11    


 


Promyelocytes %  0 %  03/25/20  04:11    


 


Blast Cells %  0 %  03/25/20  04:11    


 


Nucleated RBC %  Not Reportable   03/25/20  04:11    


 


Seg Neutrophils # Man  1.5 K/mm3 (1.8-7.7)  L  03/25/20  04:11    


 


Band Neutrophils #  0.0 K/mm3  03/25/20  04:11    


 


Lymphocytes # (Manual)  0.6 K/mm3 (1.2-5.4)  L  03/25/20  04:11    


 


Abs React Lymphs (Man)  0.0 K/mm3  03/25/20  04:11    


 


Monocytes # (Manual)  0.4 K/mm3 (0.0-0.8)   03/25/20  04:11    


 


Eosinophils # (Manual)  0.0 K/mm3 (0.0-0.4)   03/25/20  04:11    


 


Basophils # (Manual)  0.0 K/mm3 (0.0-0.1)   03/25/20  04:11    


 


Metamyelocytes #  0.0 K/mm3  03/25/20  04:11    


 


Myelocytes #  0.0 K/mm3  03/25/20  04:11    


 


Promyelocytes #  0.0 K/mm3  03/25/20  04:11    


 


Blast Cells #  0.0 K/mm3  03/25/20  04:11    


 


WBC Morphology  Not Reportable   03/25/20  04:11    


 


Hypersegmented Neuts  Not Reportable   03/25/20  04:11    


 


Hyposegmented Neuts  Not Reportable   03/25/20  04:11    


 


Hypogranular Neuts  Not Reportable   03/25/20  04:11    


 


Smudge Cells  Not Reportable   03/25/20  04:11    


 


Toxic Granulation  Not Reportable   03/25/20  04:11    


 


Toxic Vacuolation  Not Reportable   03/25/20  04:11    


 


Dohle Bodies  Not Reportable   03/25/20  04:11    


 


Pelger-Huet Anomaly  Not Reportable   03/25/20  04:11    


 


Gina Rods  Not Reportable   03/25/20  04:11    


 


Platelet Estimate  Consistent w auto   03/25/20  04:11    


 


Clumped Platelets  Not Reportable   03/25/20  04:11    


 


Plt Clumps, EDTA  Not Reportable   03/25/20  04:11    


 


Large Platelets  Not Reportable   03/25/20  04:11    


 


Giant Platelets  Not Reportable   03/25/20  04:11    


 


Platelet Satelliting  Not Reportable   03/25/20  04:11    


 


Plt Morphology Comment  Not Reportable   03/25/20  04:11    


 


RBC Morphology  Not Reportable   03/25/20  04:11    


 


Dimorphic RBCs  Not Reportable   03/25/20  04:11    


 


Polychromasia  Not Reportable   03/25/20  04:11    


 


Hypochromasia  Not Reportable   03/25/20  04:11    


 


Poikilocytosis  Not Reportable   03/25/20  04:11    


 


Anisocytosis  1+   03/25/20  04:11    


 


Microcytosis  Not Reportable   03/25/20  04:11    


 


Macrocytosis  Not Reportable   03/25/20  04:11    


 


Spherocytes  Not Reportable   03/25/20  04:11    


 


Pappenheimer Bodies  Not Reportable   03/25/20  04:11    


 


Sickle Cells  Not Reportable   03/25/20  04:11    


 


Target Cells  Not Reportable   03/25/20  04:11    


 


Tear Drop Cells  Not Reportable   03/25/20  04:11    


 


Ovalocytes  Not Reportable   03/25/20  04:11    


 


Helmet Cells  Not Reportable   03/25/20  04:11    


 


[place] Bodies  Not Reportable   03/25/20  04:11    


 


Cabot Rings  Not Reportable   03/25/20  04:11    


 


Bern Cells  Not Reportable   03/25/20  04:11    


 


Bite Cells  Not Reportable   03/25/20  04:11    


 


Crenated Cell  Not Reportable   03/25/20  04:11    


 


Elliptocytes  Not Reportable   03/25/20  04:11    


 


Acanthocytes (Spur)  Not Reportable   03/25/20  04:11    


 


Rouleaux  Not Reportable   03/25/20  04:11    


 


Hemoglobin C Crystals  Not Reportable   03/25/20  04:11    


 


Schistocytes  Not Reportable   03/25/20  04:11    


 


Malaria parasites  Not Reportable   03/25/20  04:11    


 


Bandar Bodies  Not Reportable   03/25/20  04:11    


 


Hem Pathologist Commnt  No   03/25/20  04:11    


 


Sodium  137 mmol/L (137-145)   03/25/20  04:11    


 


Potassium  3.9 mmol/L (3.6-5.0)   03/25/20  04:11    


 


Chloride  96.7 mmol/L ()  L  03/25/20  04:11    


 


Carbon Dioxide  26 mmol/L (22-30)   03/25/20  04:11    


 


Anion Gap  18 mmol/L  03/25/20  04:11    


 


BUN  8 mg/dL (7-17)   03/25/20  04:11    


 


Creatinine  0.8 mg/dL (0.7-1.2)   03/25/20  04:11    


 


Estimated GFR  > 60 ml/min  03/25/20  04:11    


 


BUN/Creatinine Ratio  10 %  03/25/20  04:11    


 


Glucose  67 mg/dL ()   03/25/20  04:11    


 


Calcium  8.7 mg/dL (8.4-10.2)   03/25/20  04:11    


 


Total Bilirubin  0.20 mg/dL (0.1-1.2)   03/24/20  19:27    


 


Direct Bilirubin  < 0.2 mg/dL (0-0.2)   03/24/20  19:27    


 


Indirect Bilirubin  0.0 mg/dL  03/24/20  19:27    


 


AST  26 units/L (5-40)   03/24/20  19:27    


 


ALT  13 units/L (7-56)   03/24/20  19:27    


 


Alkaline Phosphatase  97 units/L ()   03/24/20  19:27    


 


Total Protein  7.1 g/dL (6.3-8.2)   03/24/20  19:27    


 


Albumin  3.8 g/dL (3.9-5)  L  03/24/20  19:27    


 


Albumin/Globulin Ratio  1.2 %  03/24/20  19:27    


 


Procalcitonin  < 0.05 ng/mL (<0.15)   03/25/20  20:54    


 


HCG, Qual  Negative  (Negative)   03/24/20  20:49    


 


Urine Color  Yellow  (Yellow)   03/24/20  Unknown


 


Urine Turbidity  Clear  (Clear)   03/24/20  Unknown


 


Urine pH  6.0  (5.0-7.0)   03/24/20  Unknown


 


Ur Specific Gravity  1.020  (1.003-1.030)   03/24/20  Unknown


 


Urine Protein  <15 mg/dl mg/dL (Negative)   03/24/20  Unknown


 


Urine Glucose (UA)  Neg mg/dL (Negative)   03/24/20  Unknown


 


Urine Ketones  Neg mg/dL (Negative)   03/24/20  Unknown


 


Urine Blood  Neg  (Negative)   03/24/20  Unknown


 


Urine Nitrite  Neg  (Negative)   03/24/20  Unknown


 


Urine Bilirubin  Neg  (Negative)   03/24/20  Unknown


 


Urine Urobilinogen  2.0 mg/dL (<2.0)   03/24/20  Unknown


 


Ur Leukocyte Esterase  Sm  (Negative)   03/24/20  Unknown


 


Urine WBC (Auto)  8.0 /HPF (0.0-6.0)  H  03/24/20  Unknown


 


Urine RBC (Auto)  6.0 /HPF (0.0-6.0)   03/24/20  Unknown


 


U Epithel Cells (Auto)  3.0 /HPF (0-13.0)   03/24/20  Unknown


 


Urine Mucus  2+ /HPF  03/24/20  Unknown


 


Urine HCG, Qual  Negative  (Negative)   03/24/20  Unknown


 


Influenza A (Rapid)  Negative  (Negative)   03/24/20  Unknown


 


Influenza B (Rapid)  Negative  (Negative)   03/24/20  Unknown











Microbiology: 


Microbiology





03/25/20 20:56   Peripheral/Venous   Blood Culture - Preliminary


                            NO GROWTH AFTER 24 HOURS


03/25/20 20:56   Peripheral/Venous   Blood Culture - Preliminary


                            NO GROWTH AFTER 24 HOURS


03/24/20 Unknown   Urine,Clean Catch   Urine Culture - Final








Petersen/IV: 


                                        





Voiding Method                   Toilet


IV Catheter Type [Right          INT / Saline Lock


Antecubital]                     











Active Medications





- Current Medications


Current Medications: 














Generic Name Dose Route Start Last Admin





  Trade Name Freq  PRN Reason Stop Dose Admin


 


Acetaminophen  650 mg  03/25/20 00:10 





  Tylenol  PO  





  Q4H PRN  





  Pain MILD(1-3)/Fever >100.5/HA  


 


Amlodipine Besylate  10 mg  03/26/20 10:00  03/26/20 10:15





  Amlodipine  PO   10 mg





  DAILY MARGAUX   Administration


 


Enoxaparin Sodium  40 mg  03/25/20 10:00  03/26/20 10:14





  Enoxaparin  SUB-Q   40 mg





  QDAY MARGAUX   Administration


 


Hydralazine HCl  5 mg  03/25/20 00:15 





  Apresoline  IV  





  Q6H PRN  





  Hypertension  


 


Sodium Chloride  1,000 mls @ 125 mls/hr  03/25/20 01:00  03/27/20 04:04





  Nacl 0.45% 1000 Ml  IV   125 mls/hr





  AS DIRECT MARGAUX   Administration


 


Levofloxacin  750 mg  03/27/20 10:00 





  Levaquin  PO  03/29/20 12:00 





  DAILY [facility]  


 


Methocarbamol  500 mg  03/25/20 19:00  03/27/20 06:11





  Robaxin  PO   500 mg





  Q6H MARGAUX   Administration


 


Metoclopramide HCl  10 mg  03/24/20 20:47 





  Reglan  IV  





  Q6H PRN  





  Nausea And Vomiting  


 


Naproxen  500 mg  03/25/20 22:00  03/26/20 22:27





  Naproxen  PO   500 mg





  BID MARGAUX   Administration


 


Nicotine  14 mg  03/27/20 10:00 





  Habitrol  TD  





  QDAY MARGAUX  


 


Ondansetron HCl  4 mg  03/25/20 00:10 





  Zofran  IV  





  Q8H PRN  





  Nausea And Vomiting  


 


Oxycodone/Acetaminophen  1 tab  03/25/20 00:10  03/26/20 20:20





  Percocet 5/325  PO   1 tab





  Q4H PRN   Administration





  Pain, Moderate (4-6)  


 


Sodium Chloride  10 ml  03/25/20 10:00  03/26/20 22:32





  Sodium Chloride Flush Syringe 10 Ml  IV   Not Given





  BID MARGAUX  


 


Sodium Chloride  10 ml  03/25/20 00:10 





  Sodium Chloride Flush Syringe 10 Ml  IV  





  PRN PRN  





  LINE FLUSH

## 2020-03-27 NOTE — PROGRESS NOTE
Assessment and Plan





Cultures:


Blood culture 3/25/2020 pending


Urine culture 3/24/2020 pending





A/P: 39-year-old female past medical history hypertension, obesity admitted to 

the hospital with URI symptoms concerning for COVID-19.








#COVID-19 rule out: Patient with consistent URI symptoms, though no acute 

pneumonia seen on chest x-ray.  The patient is leukopenic, the patient's with 

poor immune system can sometimes have trouble creating the information required 

to be seen on chest x-ray.  Survey already filled out, [facility] Test is pending, others from that day have returned 

positive so likely to be negative (though not confirmed)





#URI: Procalcitonin normal. 





#Fevers: Possibly due to pneumonia as per above, denies any other symptoms 

including dysuria or symptoms concerning for urinary tract infection.





#Leukopenia: Agree with sending test for HIV, already obtained and sent to lab.





Recs:


-Follow-up Department [facility] testing for COVID-19


-Continue levofloxacin for now to complete 5 days per possible community-

acquired pneumonia


-Follow-up urine cultures


-follow up blood cultures


-Follow-up HIV testing. Likely to take up to a week to return. If positive 

recommend she follow up at the [facility] as she is self-pay and we 

cannot organize the follow up labs for her. 





- Patient may be discharged when medically stable if testing swabs have been 

obtained. Upon discharge patient should self-quarantine at home until COVID 

testing returns. If negative, self-quarantine may end. If positive patient 

should self-quarantine for 14 days from symptom beginning. Public health and 

infection prevention will follow up with patients to notify them of their test 

results. Patients should return to hospital regardless if they have worsening 

fevers or respiratory status.





Thank for the consult, will sign of for now. Please call us if her respiratory 

status worsens or if her HIV test returns positive. 





LANDEN Quiñones MD


[facility], [facility] ([facility])


M: [phone]


O: [phone]


F: [phone]





Subjective


Date of service: 03/27/20


Interval history: 





Patient remains afebrile. [place] is low. Awaiting HIV test. 





Objective





- Exam


Narrative Exam: 





Physical Exam: 


Constitutional: Alert, cooperative. No acute distress.  Obese


Neck: Supple, no meningeal signs


Oral: dentition fair, no thrush


Cardiovascular: S1, S2 normal. 


Respiratory: Good air entry, clear to auscultation bilaterally


GI: Soft, non-tender; bowel sounds normal. No peritoneal signs. 


Musculoskeletal: No pedal edema, no cyanosis.


Skin: No rash or abscess


Hem/Lymphatic: No palpable cervical or supraclavicular nodes. No lymphangitis


Psych: Mood ok. Affect normal


Neurological: Awake, alert, oriented. No gross abnormality





- Constitutional


Vitals: 


                                   Vital Signs











Temp Pulse Resp BP Pulse Ox


 


 98.1 F   60   18   124/76   100 


 


 03/27/20 05:38  03/27/20 11:04  03/27/20 05:38  03/27/20 11:04  03/27/20 05:38








                           Temperature -Last 24 Hours











Temperature                    98.1 F


 


Temperature                    98.4 F


 


Temperature                    98.4 F

















- Labs


CBC & Chem 7: 


                                 03/25/20 04:11





                                 03/25/20 04:11

## 2020-03-28 VITALS — SYSTOLIC BLOOD PRESSURE: 123 MMHG | DIASTOLIC BLOOD PRESSURE: 90 MMHG

## 2020-03-28 RX ADMIN — ENOXAPARIN SODIUM SCH MG: 100 INJECTION SUBCUTANEOUS at 10:06

## 2020-03-28 RX ADMIN — OXYCODONE AND ACETAMINOPHEN PRN TAB: 5; 325 TABLET ORAL at 12:08

## 2020-03-28 RX ADMIN — Medication SCH ML: at 10:07

## 2020-03-28 RX ADMIN — SODIUM CHLORIDE SCH MLS/HR: 0.45 INJECTION, SOLUTION INTRAVENOUS at 00:39

## 2020-03-28 RX ADMIN — NICOTINE SCH MG: 14 PATCH TRANSDERMAL at 10:07

## 2020-03-28 NOTE — DISCHARGE SUMMARY
Providers





- Providers


Date of Admission: 


03/24/20 23:57





Date of discharge: 03/28/20


Attending physician: 


AMY J KOCHERLA





                                        





03/25/20 00:10


Consult to Physician [CONS] Routine 


   Comment: 


   Consulting Provider: DARWIN JARVIS


   Physician Instructions: 


   Reason For Exam: fever,leukopenia











Primary care physician: 


PRIMARY CARE MD








Hospitalization


Condition: Fair


Hospital course: 


29-year-old woman with a history of hypertension comes emergency room for 

evaluation.  Patient was initially seen in the emergency room on the 22nd for 

tooth pain, then on the 23rd for an episode of palpitation.  She returns today 

complaining of a nonproductive cough, fever, chills, body aches, fatigue.  [place] 

[place], denies recent travel, sick contacts.  She has a history of

 thrombocytopenia since 2018, no bleeding from mucosal surfaces.  Patient will 

be admitted for upper respiratory symptoms, possible coronavirus rele out,


Patient was admitted and placed on contact  and droplet isolation, Covid-19 

tests were done forms were sent to health department


Empiric antibiotics given for UTI/possible pneumonia, symptoms gradually 

improved


Covid-19 test is still pending.  Today patient is comfortable no new complaints


Vital signs stable, physical examination unremarkable


Patient will follow with primary care physician, ID per schedule 


Will follow with health department for reports


Self quarantine for 14 days, wear mask and thorough handwashing.


If you have any questions contact PMD, or health department








Discharge diagnosis


 and management;


--Upper respiratory symptoms, rule out COVID


Patient will be placed on droplet and contact precautions


Empiric antibiotics, ID following





--Febrile illness; low-grade fever


To rule out Covid 19, forms filled out


Test report still pending





--Urinary tract infection


On IV Levaquin, cultures negative to date


Total 7 days of Levaquin 





--Hypertension


IV hydralazine for control,, continue outpatient medications





--Chronic  leukopenia





--Thrombocytopenia; due to chronic illness


No evidence of bleeding, closely monitor


Transfuse platelets as needed





--DVT prophylax; Lovenox





Cleared by ID


Stable at discharge














Disposition: DC-01 TO HOME OR SELFCARE


Time spent for discharge: 32 min





Core Measure Documentation





- Palliative Care


Palliative Care/ Comfort Measures: Not Applicable





- Core Measures


Any of the following diagnoses?: none





Exam





- Constitutional


Vitals: 


                                        











Temp Pulse Resp BP Pulse Ox


 


 98.5 F   55 L  18   105/61   100 


 


 03/28/20 06:15  03/28/20 10:11  03/28/20 06:15  03/28/20 10:11  03/28/20 06:15











General appearance: Present: no acute distress, well-nourished





- EENT


Eyes: Present: PERRL, EOM intact





- Neck


Neck: Present: supple, normal ROM





- Respiratory


Respiratory effort: normal


Respiratory: bilateral: diminished, negative: rales, rhonchi, wheezing





- Cardiovascular


Rhythm: regular


Heart Sounds: Present: S1 & S2





- Extremities


Extremities: no ischemia, No edema





- Abdominal


General gastrointestinal: Present: soft, non-tender, non-distended, normal bowel

 sounds





- Integumentary


Integumentary: Present: clear, warm





- Musculoskeletal


Musculoskeletal: strength equal bilaterally, generalized weakness





- Psychiatric


Psychiatric: appropriate mood/affect, cooperative





- Neurologic


Neurologic: moves all extremities





Plan


Activity: advance as tolerated, other (Self quarantine for total 14 days)


Diet: regular


Additional Instructions: Instructions:Pt should self-quarantine at home until 

COVID testing returns. If negative, self-quarantine may end. If positive patient

 should self-quarantine for 14 days from symptom beginning.  Public health and 

infection prevention will follow up with patients to notify them of their test 

results. Patients should return to hospital regardless if they have worsening fe

vers or respiratory status.  Patient advised to use mask, and thorough 

handwashing


Follow up with: 


PRIMARY CARE,MD [Primary Care Provider] - 3-5 Days


MALINA CHEN MD [Staff Physician] - 14 Days


Prescriptions: 


amLODIPine 10 mg PO DAILY #30 tablet


levoFLOXacin [Levaquin TAB] 750 mg PO DAILY #3 tablet

## 2021-01-06 ENCOUNTER — HOSPITAL ENCOUNTER (EMERGENCY)
Dept: HOSPITAL 5 - ED | Age: 31
Discharge: LEFT BEFORE DISCHARGE | End: 2021-01-06
Payer: SELF-PAY

## 2021-01-06 VITALS — SYSTOLIC BLOOD PRESSURE: 179 MMHG | DIASTOLIC BLOOD PRESSURE: 113 MMHG

## 2021-01-06 DIAGNOSIS — R05: Primary | ICD-10-CM

## 2021-01-06 DIAGNOSIS — Z53.21: ICD-10-CM

## 2021-02-05 ENCOUNTER — HOSPITAL ENCOUNTER (EMERGENCY)
Dept: HOSPITAL 5 - ED | Age: 31
Discharge: HOME | End: 2021-02-05
Payer: SELF-PAY

## 2021-02-05 VITALS — DIASTOLIC BLOOD PRESSURE: 109 MMHG | SYSTOLIC BLOOD PRESSURE: 165 MMHG

## 2021-02-05 DIAGNOSIS — I10: ICD-10-CM

## 2021-02-05 DIAGNOSIS — Z98.51: ICD-10-CM

## 2021-02-05 DIAGNOSIS — F17.200: ICD-10-CM

## 2021-02-05 DIAGNOSIS — L03.114: Primary | ICD-10-CM

## 2021-02-05 DIAGNOSIS — Z79.899: ICD-10-CM

## 2021-02-05 PROCEDURE — 99282 EMERGENCY DEPT VISIT SF MDM: CPT

## 2021-02-05 NOTE — EMERGENCY DEPARTMENT REPORT
Abscess Boil HPI





- HPI


Chief Complaint: Extremity Problem,Nontraumatic


Stated Complaint: LT HAND PAIN


Time Seen by Provider: 02/05/21 17:17


Duration: 4 Days


Location: Upper Extremity (left hand)


Severity: Mild


History: Yes Pain, Yes Purulent Drainage, Yes Previous History, No Fever, No 

Numbness, No Foreign Body, No Insect Bite


HPI: This is a 30-year-old female with a history of high blood pressure on 

medication presents the ED complaining of left hand pain and scabbing x3 days.  

Patient states about 4 days ago she noticed a bump and redness to her left hand 

between her thumb and the first finger.  Patient states that 2 days ago she had 

pus drainage come out and wound started so dry and scab.  Patient states she 

still having pain to the hand.  Patient states she was not bitten by any insect 

or animal.  Patient states she is able to use that hand still.  She denies 

fever/chills/nausea vomiting abdominal pain


Home Medications: 


                                  Previous Rx's











 Medication  Instructions  Recorded  Last Taken  Type


 


Omeprazole 40 mg PO DAILY #30 capsule. 04/26/19 Unknown Rx


 


Sucralfate [Carafate] 1 gm PO ACHS 7 Days #28 tablet 04/26/19 Unknown Rx


 


methOCARBAMOL [Robaxin TAB] 500 mg PO Q6H #20 tablet 11/20/19 Unknown Rx


 


traMADoL [Ultram 50 MG tab] 50 mg PO Q6HR PRN #7 tablet 03/22/20 Unknown Rx


 


amLODIPine 10 mg PO DAILY #30 tablet 03/28/20 Unknown Rx


 


levoFLOXacin [Levaquin TAB] 750 mg PO DAILY #3 tablet 03/28/20 Unknown Rx


 


Clindamycin [Clindamycin CAP] 300 mg PO Q8H #21 cap 02/05/21 Unknown Rx


 


Ibuprofen [Motrin 600 MG tab] 600 mg PO Q8H PRN #30 tablet 02/05/21 Unknown Rx


 


Sulfamethoxazole/Trimethoprim 1 each PO BID #10 tablet 02/05/21 Unknown Rx





[Bactrim DS TAB]    











Allergies/Adverse Reactions: 


                                    Allergies











Allergy/AdvReac Type Severity Reaction Status Date / Time


 


ceftriaxone [From Rocephin] Allergy  Unknown Verified 01/06/21 13:42














ED Review of Systems


ROS: 


Stated complaint: LT HAND PAIN


Other details as noted in HPI





Comment: All other systems reviewed and negative





ED Past Medical Hx





- Past Medical History


Previous Medical History?: Yes


Hx Hypertension: Yes


Additional medical history: vaginal ablation





- Surgical History


Past Surgical History?: Yes


Additional Surgical History: tubal ligation, Endometrial Ablation





- Social History


Smoking Status: Current Every Day Smoker


Substance Use Type: None





- Medications


Home Medications: 


                                Home Medications











 Medication  Instructions  Recorded  Confirmed  Last Taken  Type


 


Omeprazole 40 mg PO DAILY #30 capsule. 04/26/19  Unknown Rx


 


Sucralfate [Carafate] 1 gm PO ACHS 7 Days #28 tablet 04/26/19  Unknown Rx


 


methOCARBAMOL [Robaxin TAB] 500 mg PO Q6H #20 tablet 11/20/19  Unknown Rx


 


traMADoL [Ultram 50 MG tab] 50 mg PO Q6HR PRN #7 tablet 03/22/20  Unknown Rx


 


amLODIPine 10 mg PO DAILY #30 tablet 03/28/20  Unknown Rx


 


levoFLOXacin [Levaquin TAB] 750 mg PO DAILY #3 tablet 03/28/20  Unknown Rx


 


Clindamycin [Clindamycin CAP] 300 mg PO Q8H #21 cap 02/05/21  Unknown Rx


 


Ibuprofen [Motrin 600 MG tab] 600 mg PO Q8H PRN #30 tablet 02/05/21  Unknown Rx


 


Sulfamethoxazole/Trimethoprim 1 each PO BID #10 tablet 02/05/21  Unknown Rx





[Bactrim DS TAB]     














ED Abscess Boil Physical Exam





- Exam


General: 


Vital signs noted. No distress. Alert and acting appropriately.





Size: 1 cm


Exam: Yes Tenderness, Yes Normal Neurologic Exam, Yes Normal Circulation, No 

Fluctuance, No Surrounding Cellulites/Erythema, No Lymphangitis, No Crepitation,

No Heart Murmur





ED Course


                                   Vital Signs











  02/05/21





  17:05


 


Temperature 98.6 F


 


Pulse Rate 71


 


Respiratory 18





Rate 


 


Blood Pressure 165/109


 


O2 Sat by Pulse 99





Oximetry 











Critical care attestation.: 


If time is entered above; I have spent that time in minutes in the direct care 

of this critically ill patient, excluding procedure time.








ED Medical Decision Making





- Medical Decision Making


30-year-old female presents with drained small abscess of the left hand.


Discussed with patient that wound will heal on its own.  Discussed heat therapy 

3 times a day.  Discussed therapy antibiotic and completion.


Patient is in no acute or respiratory distress.


Discussed with patient to follow-up with primary care physician in 2 to 3 days.


Patient understands all instructions and will follow-up.











- Differential Diagnosis


Cellulitis, abscess, abrasion, insect bite





ED Disposition


Clinical Impression: 


 Cellulitis of left hand





Disposition: DC-01 TO HOME OR SELFCARE


Is pt being admited?: No


Does the pt Need Aspirin: No


Condition: Stable


Instructions:  Cellulitis, Adult


Additional Instructions: 


Make sure to follow up with the primary care physician as discussed.


Take all your medications as you've been prescribed.


If you have any worsening symptoms or develop new symptoms please return to ED 

immediately.


Prescriptions: 


Sulfamethoxazole/Trimethoprim [Bactrim DS TAB] 1 each PO BID #10 tablet


Clindamycin [Clindamycin CAP] 300 mg PO Q8H #21 cap


Ibuprofen [Motrin 600 MG tab] 600 mg PO Q8H PRN #30 tablet


 PRN Reason: Pain


Referrals: 


PRIMARY CARE,MD [Primary Care Provider] - 3-5 Days


[facility] [Outside] - 3-5 Days


[facility] [Outside] - 3-5 Days


Forms:  Accompanied Note, Work/School Release Form(ED)


Time of Disposition: 18:58